# Patient Record
Sex: MALE | Race: OTHER | NOT HISPANIC OR LATINO | URBAN - METROPOLITAN AREA
[De-identification: names, ages, dates, MRNs, and addresses within clinical notes are randomized per-mention and may not be internally consistent; named-entity substitution may affect disease eponyms.]

---

## 2018-08-20 ENCOUNTER — EMERGENCY (EMERGENCY)
Facility: HOSPITAL | Age: 63
LOS: 1 days | Discharge: HOME | End: 2018-08-20
Attending: STUDENT IN AN ORGANIZED HEALTH CARE EDUCATION/TRAINING PROGRAM
Payer: MEDICAID

## 2018-08-20 VITALS
RESPIRATION RATE: 18 BRPM | TEMPERATURE: 99 F | SYSTOLIC BLOOD PRESSURE: 152 MMHG | HEART RATE: 94 BPM | DIASTOLIC BLOOD PRESSURE: 90 MMHG | OXYGEN SATURATION: 98 %

## 2018-08-20 PROCEDURE — 99283 EMERGENCY DEPT VISIT LOW MDM: CPT | Mod: 25

## 2018-08-20 NOTE — ED ADULT TRIAGE NOTE - CHIEF COMPLAINT QUOTE
R hip pain x 2 months, has had a spinal CT. was referred to orthopedic but would not take his medicare

## 2018-08-21 VITALS
TEMPERATURE: 98 F | OXYGEN SATURATION: 95 % | HEART RATE: 81 BPM | SYSTOLIC BLOOD PRESSURE: 126 MMHG | RESPIRATION RATE: 17 BRPM | DIASTOLIC BLOOD PRESSURE: 84 MMHG

## 2018-08-21 PROBLEM — Z00.00 ENCOUNTER FOR PREVENTIVE HEALTH EXAMINATION: Status: ACTIVE | Noted: 2018-08-21

## 2018-08-21 PROCEDURE — 73502 X-RAY EXAM HIP UNI 2-3 VIEWS: CPT

## 2018-08-21 PROCEDURE — 99284 EMERGENCY DEPT VISIT MOD MDM: CPT

## 2018-08-21 PROCEDURE — 73502 X-RAY EXAM HIP UNI 2-3 VIEWS: CPT | Mod: 26,RT

## 2018-08-21 PROCEDURE — 72170 X-RAY EXAM OF PELVIS: CPT

## 2018-08-21 RX ORDER — ACETAMINOPHEN 500 MG
650 TABLET ORAL ONCE
Qty: 0 | Refills: 0 | Status: COMPLETED | OUTPATIENT
Start: 2018-08-21 | End: 2018-08-21

## 2018-08-21 RX ORDER — IBUPROFEN 200 MG
600 TABLET ORAL ONCE
Qty: 0 | Refills: 0 | Status: COMPLETED | OUTPATIENT
Start: 2018-08-21 | End: 2018-08-21

## 2018-08-21 RX ADMIN — Medication 650 MILLIGRAM(S): at 01:32

## 2018-08-21 RX ADMIN — Medication 600 MILLIGRAM(S): at 01:32

## 2018-08-21 NOTE — ED PROVIDER NOTE - PROGRESS NOTE DETAILS
Sanket PGY-3: Pt reports improved hip pain. Xray prelim read negative. Will d.c home with ortho follow up.

## 2018-08-21 NOTE — ED ADULT NURSE REASSESSMENT NOTE - NS ED NURSE REASSESS RESPONSE TO CARE
feeling better/patient states "ready to go home"/cane given to pt by Dr Coles and pt ambulating with steady gait with cane and states " I feel better walking now"

## 2018-08-21 NOTE — ED PROVIDER NOTE - MEDICAL DECISION MAKING DETAILS
63M p/w likely arthritic vs sciatic right hip pain. Low suspicion for cord compression. Will check xray hip, give tylenol and advil and provide ortho referral

## 2018-08-21 NOTE — ED PROVIDER NOTE - PLAN OF CARE
Follow up with your PMD in 24-48 hours. Follow up with an orthopedist at the next available appointment.   Take tylenol 650mg every 6 hours and ibuprofen 400 mg every 4 hours as needed for pain.

## 2018-08-21 NOTE — ED PROVIDER NOTE - ATTENDING CONTRIBUTION TO CARE
batsheva hip pain decr hip rom rst wnl 62 yo M pmh lumbar disc dz with worsening right hip pain x 2 months. non radiating and  worse with walking. he denies trauma. fever, chills. Has been using cane ot help him walk. He is pending ortho appt and evaluation.   GEN: no acute respiratory distress. nontoxic, speaking comfortably in full sentences, ambulating with steady gait with cane.  HEENT: NCAT. face symmetrical. PERRL 4mm, EOMI  MMM, oropharynx wnl.  Neck: no JVD, trachea midline, no LAD  CV: RRR. +S1S2, no murmur. 2+ pulses in 4 extremities, cap refill <2 sec  Chest: CTA B/l. no wheezing, rales, rhonchi. no retractions. good air movement.   ABD: +BS, soft, non distended, non tender. No guarding/rebound.  : no cva or suprapubic tenderness  MSK: No clubbing, cyanosis, edema. decreased flexion of right hip 2/2 pain. no tenderness to palpation. No midline or paraspinal tenderness. no spinal step-offs.  Neuro: AOOX3.  CN 2-12 intact; Sensation intact, motor 5/5 throughout. no ataxia  SKIN: No erythema, lesions or rash    right hip pain. neurologically intact. pulses equal bilaterally.no back pain.  r/o occult fx. pain relief. reassess

## 2018-08-21 NOTE — ED ADULT NURSE NOTE - OBJECTIVE STATEMENT
pt c/o "continued rt hip pain x 2 months., minimal relief with alleve -last taken in am. Saw PMD who sent me for a lumbar CT scan and referrla for orthopedic however the orthopedic does not take my insurance and the insurance company told me to come to ER if still having pain. No fall/injury/swelling/brusing. pain is worse with weight bearing and walking"

## 2018-08-21 NOTE — ED PROVIDER NOTE - OBJECTIVE STATEMENT
63M PMH L5 slipped disc p/w worsening right hip pain which is progressive over the past 63M PMH L5 slipped disc p/w worsening right hip pain which is progressive over the past two months and now causing difficulty walking. Pt was supposed to see orthopedist for this issue but visit denied by insurance. Insurance recommended pt come to ER if pain worsened. Denies fevers, chills, abd pain, n/v/d, chest pain, SOB. Denies bladder/bowel incontinence and saddle anesthesia.

## 2018-08-21 NOTE — ED PROVIDER NOTE - PHYSICAL EXAMINATION
Gen: NAD, AOx3  Head: NCAT  HEENT: PERRL, oral mucosa moist, normal conjunctiva, oropharynx clear without exudate or erythema  Lung: CTAB, no respiratory distress, no wheezing, rales, rhonchi  CV: normal s1/s2, rrr, no murmurs, Normal perfusion, pulses 2+ throughout  Abd: soft, NTND, no CVA tenderness  MSK: No edema, no visible deformities, antalgic gait, able to move all extremities, pain with right hip flexion, 5/5 strength in all extremity groups, no spinal tenderness  Neuro: CN II-XII grossly intact, No focal neurologic deficits. Sensation intact in inner thigh.   Skin: No rash   Psych: normal affect

## 2018-08-21 NOTE — ED PROVIDER NOTE - CARE PLAN
Principal Discharge DX:	Pain of right hip joint  Assessment and plan of treatment:	Follow up with your PMD in 24-48 hours. Follow up with an orthopedist at the next available appointment.   Take tylenol 650mg every 6 hours and ibuprofen 400 mg every 4 hours as needed for pain.

## 2019-01-01 ENCOUNTER — EMERGENCY (EMERGENCY)
Facility: HOSPITAL | Age: 64
LOS: 1 days | End: 2019-01-01
Attending: STUDENT IN AN ORGANIZED HEALTH CARE EDUCATION/TRAINING PROGRAM
Payer: MEDICAID

## 2019-01-01 VITALS
HEART RATE: 84 BPM | HEIGHT: 71.26 IN | TEMPERATURE: 98 F | SYSTOLIC BLOOD PRESSURE: 164 MMHG | OXYGEN SATURATION: 96 % | WEIGHT: 289.91 LBS | DIASTOLIC BLOOD PRESSURE: 88 MMHG | RESPIRATION RATE: 95 BRPM

## 2019-01-01 VITALS
HEART RATE: 70 BPM | TEMPERATURE: 98 F | RESPIRATION RATE: 18 BRPM | DIASTOLIC BLOOD PRESSURE: 82 MMHG | OXYGEN SATURATION: 97 % | SYSTOLIC BLOOD PRESSURE: 111 MMHG

## 2019-01-01 LAB
ANION GAP SERPL CALC-SCNC: 14 MMOL/L — SIGNIFICANT CHANGE UP (ref 5–17)
BASOPHILS # BLD AUTO: 0.1 K/UL — SIGNIFICANT CHANGE UP (ref 0–0.2)
BASOPHILS NFR BLD AUTO: 0.8 % — SIGNIFICANT CHANGE UP (ref 0–2)
BUN SERPL-MCNC: 12 MG/DL — SIGNIFICANT CHANGE UP (ref 7–23)
CALCIUM SERPL-MCNC: 9.2 MG/DL — SIGNIFICANT CHANGE UP (ref 8.4–10.5)
CHLORIDE SERPL-SCNC: 101 MMOL/L — SIGNIFICANT CHANGE UP (ref 96–108)
CK SERPL-CCNC: 130 U/L — SIGNIFICANT CHANGE UP (ref 30–200)
CO2 SERPL-SCNC: 23 MMOL/L — SIGNIFICANT CHANGE UP (ref 22–31)
CREAT SERPL-MCNC: 1.12 MG/DL — SIGNIFICANT CHANGE UP (ref 0.5–1.3)
EOSINOPHIL # BLD AUTO: 0.2 K/UL — SIGNIFICANT CHANGE UP (ref 0–0.5)
EOSINOPHIL NFR BLD AUTO: 2.3 % — SIGNIFICANT CHANGE UP (ref 0–6)
GLUCOSE SERPL-MCNC: 108 MG/DL — HIGH (ref 70–99)
HCT VFR BLD CALC: 41.9 % — SIGNIFICANT CHANGE UP (ref 39–50)
HGB BLD-MCNC: 14.5 G/DL — SIGNIFICANT CHANGE UP (ref 13–17)
LYMPHOCYTES # BLD AUTO: 1.9 K/UL — SIGNIFICANT CHANGE UP (ref 1–3.3)
LYMPHOCYTES # BLD AUTO: 27.5 % — SIGNIFICANT CHANGE UP (ref 13–44)
MCHC RBC-ENTMCNC: 30.8 PG — SIGNIFICANT CHANGE UP (ref 27–34)
MCHC RBC-ENTMCNC: 34.7 GM/DL — SIGNIFICANT CHANGE UP (ref 32–36)
MCV RBC AUTO: 88.8 FL — SIGNIFICANT CHANGE UP (ref 80–100)
MONOCYTES # BLD AUTO: 0.5 K/UL — SIGNIFICANT CHANGE UP (ref 0–0.9)
MONOCYTES NFR BLD AUTO: 7.7 % — SIGNIFICANT CHANGE UP (ref 2–14)
NEUTROPHILS # BLD AUTO: 4.3 K/UL — SIGNIFICANT CHANGE UP (ref 1.8–7.4)
NEUTROPHILS NFR BLD AUTO: 61.6 % — SIGNIFICANT CHANGE UP (ref 43–77)
PLATELET # BLD AUTO: 216 K/UL — SIGNIFICANT CHANGE UP (ref 150–400)
POTASSIUM SERPL-MCNC: 4.3 MMOL/L — SIGNIFICANT CHANGE UP (ref 3.5–5.3)
POTASSIUM SERPL-SCNC: 4.3 MMOL/L — SIGNIFICANT CHANGE UP (ref 3.5–5.3)
RBC # BLD: 4.72 M/UL — SIGNIFICANT CHANGE UP (ref 4.2–5.8)
RBC # FLD: 11.8 % — SIGNIFICANT CHANGE UP (ref 10.3–14.5)
SODIUM SERPL-SCNC: 138 MMOL/L — SIGNIFICANT CHANGE UP (ref 135–145)
WBC # BLD: 7 K/UL — SIGNIFICANT CHANGE UP (ref 3.8–10.5)
WBC # FLD AUTO: 7 K/UL — SIGNIFICANT CHANGE UP (ref 3.8–10.5)

## 2019-01-01 PROCEDURE — 80048 BASIC METABOLIC PNL TOTAL CA: CPT

## 2019-01-01 PROCEDURE — 99284 EMERGENCY DEPT VISIT MOD MDM: CPT | Mod: 25

## 2019-01-01 PROCEDURE — 73090 X-RAY EXAM OF FOREARM: CPT

## 2019-01-01 PROCEDURE — 85027 COMPLETE CBC AUTOMATED: CPT

## 2019-01-01 PROCEDURE — 82550 ASSAY OF CK (CPK): CPT

## 2019-01-01 PROCEDURE — 73090 X-RAY EXAM OF FOREARM: CPT | Mod: 26,LT

## 2019-01-01 PROCEDURE — 99283 EMERGENCY DEPT VISIT LOW MDM: CPT

## 2019-01-01 RX ORDER — IBUPROFEN 200 MG
600 TABLET ORAL ONCE
Qty: 0 | Refills: 0 | Status: COMPLETED | OUTPATIENT
Start: 2019-01-01 | End: 2019-01-01

## 2019-01-01 RX ADMIN — Medication 600 MILLIGRAM(S): at 03:23

## 2019-01-01 NOTE — ED PROVIDER NOTE - NSFOLLOWUPCLINICS_GEN_ALL_ED_FT
[facility]  Orthopedic Surgery  [address], [address]rd & [address]th [place] [address]  Phone: [phone]  Fax:   Follow Up Time: 7-10 Days

## 2019-01-01 NOTE — ED ADULT NURSE NOTE - NSIMPLEMENTINTERV_GEN_ALL_ED
Implemented All Universal Safety Interventions:  [place] to call system. Call bell, personal items and telephone within reach. Instruct patient to call for assistance. Room bathroom lighting operational. Non-slip footwear when patient is off stretcher. Physically safe environment: no spills, clutter or unnecessary equipment. Stretcher in lowest position, wheels locked, appropriate side rails in place.

## 2019-01-01 NOTE — ED ADULT TRIAGE NOTE - CHIEF COMPLAINT QUOTE
R forearm pain x3 days "now its getting hard"  "I was carrying groceries 5 days ago"  no redness/discoloration

## 2019-01-01 NOTE — ED PROVIDER NOTE - PHYSICAL EXAMINATION
R Arm: there is some hard swelling on the volar medial side of the forearm that is tender when palpated without overlying skin changes. Full function of the right hand, good radial and ulnar pulses. Cj Huffman DO: R Arm: isolated swelling and tenderness to the medial volar surface of the right arm. compartments soft, no ecchymosis, induration, increased warmth, skin breaks, crepitus. FROM of right upper extremity. 2+, equal radial and ulnar pulses bilaterally. 5/5 bilateral digits, wrists, forearms, elbows, shoulders. sensation equal and grossly intact in bilateral upper extremities.

## 2019-01-01 NOTE — ED ADULT NURSE NOTE - OBJECTIVE STATEMENT
63 yr old male arrived to the ED from home c/o R arm pain. per pt he was carrying heavy grocery bags on Wednesday for a few block, pt doesn't remember if he had the bags on his arm or in his hand. upon assessment pt is A&ox4, ambulatory wit ha steady gait, VSS, lungs clear [place]. pt right forearm appears swollen on lateral/ dorsal aspect. arm is firm, and tender to palpation. radial pulses strong and equal [place]. pt able to make fist, adduct and abduct fingers. full ROM of wrist and elbow joints. no redness or warmth noted. pt denies fever, numbness, tingling, chest pain, sob, nausea, and vomiting

## 2019-01-01 NOTE — ED PROVIDER NOTE - OBJECTIVE STATEMENT
Pt is a 63 Y M with no pmhx who presents with pain in his R forearm. He states that on Wed he was carrying a large amount of heavy bags on his arms and hands for a few blocks. He says that sat he noticed some significant swelling of the arm that then got a little better, then today he noticed that it was even more swollen and now painful. He denies fever, or skin changes. He denies any hx of drug use or other injury to the area. He denies any numbness tingling in the hand or inability to use the hand.

## 2019-01-01 NOTE — ED PROVIDER NOTE - MEDICAL DECISION MAKING DETAILS
63 Y M with no pmhx with forearm pain on the right with swelling and ttp. Cj Huffman DO: 62 yo M with focal right forearm pain and swelling. likely secondary to carrying heavy bags on that arm. no signs of SSTI. compartments soft. neurovascularly intact and with normal strength. labs/radiograph without significant abnormality. stable for dc. Return precautions were discussed with patient at bedside and patient expressed understanding.

## 2019-02-26 NOTE — ED ADULT NURSE NOTE - NSFALLRSKOUTCOME_ED_ALL_ED
FYI : Has now had two consistent elevated BP checks, is returning to pharmacy in one month for additional check, is scheduling MD appt for 2 months from now, if BP is still elevated at MD appt, should consider BP medication adjustment.  Universal Safety Interventions

## 2019-07-29 ENCOUNTER — EMERGENCY (EMERGENCY)
Facility: HOSPITAL | Age: 64
LOS: 1 days | Discharge: HOME | End: 2019-07-29
Attending: EMERGENCY MEDICINE
Payer: MEDICAID

## 2019-07-29 VITALS
HEART RATE: 93 BPM | HEIGHT: 71.26 IN | TEMPERATURE: 99 F | SYSTOLIC BLOOD PRESSURE: 127 MMHG | RESPIRATION RATE: 18 BRPM | DIASTOLIC BLOOD PRESSURE: 88 MMHG | WEIGHT: 300.05 LBS | OXYGEN SATURATION: 95 %

## 2019-07-29 PROCEDURE — 99282 EMERGENCY DEPT VISIT SF MDM: CPT

## 2019-07-29 NOTE — ED PROVIDER NOTE - NSFOLLOWUPINSTRUCTIONS_ED_ALL_ED_FT
Please take motrin 600 mg every 6 hours and/or tylenol 650 mg every 6 hours for pain as needed.  Please follow up with dentist within 48 hours.   Return to the ED for signs of infection, such as fever, swelling, discharge, difficulty swallowing, difficulty breathing.

## 2019-07-29 NOTE — ED PROVIDER NOTE - CLINICAL SUMMARY MEDICAL DECISION MAKING FREE TEXT BOX
65 y/o M presents after his left lower crowns fell out 1 week ago. pt requesting for extraction of teeth #20 and #21 in ED. Pt had an appt today for extraction however, did not attend appt bc he could not find parking. There are no signs of infection, abscess on exam. Pt to be discharged home with dental follow up 65 y/o M presents after his left lower crowns fell out 1 week ago. pt requesting for extraction of teeth #20 and #21 in ED. Pt had an appt today for extraction however, did not attend appt bc he could not find parking. There are no signs of infection, abscess on exam. Pt to be discharged home with dental follow up  rl pt sp crown disloadgment scheduled for tooth extraction missed his appointment -- wants extraction in ed -- no evidence of infecton no fluctuance, no fever -- no swelling - pain well controlled w nsaids - refer back to dental and dc

## 2019-07-29 NOTE — ED PROVIDER NOTE - NS ED ROS FT
Constitutional: no fevers or chills  HEENT: left lower teeth pain. no discharge  CV: no cp  Resp: no sob  GI: no abd pain, n/v, diarrhea/constipation  : no dysuria, hematuria  MSK: no joint pains  skin: no rashes  neuro: no HA, no confusion  psych: no SI/HI  heme: no LAD

## 2019-07-29 NOTE — ED ADULT NURSE NOTE - OBJECTIVE STATEMENT
Pt is 64 Y M presenting to ED with c/o of L lower dental pain. Pt reports his left sided dental bridge fell out approx 1 week ago and was unable to get an appointment to extract his dental implants #20 and #21. Pt reports increased pain and weakness causing him to come to ED. Pt reports relief in dental pain when taking meloxicam as prescribed for R hip pain.  Pt denies decreased PO intake, fevers, chills. Pt breathing unlabored on RA. Teeth missing to left lower side of mouth. No purulent drainage/bleeding noted. Safety and comfort maintained.

## 2019-07-29 NOTE — ED ADULT TRIAGE NOTE - CHIEF COMPLAINT QUOTE
left sided dental pain, pt reports that his crown fell out and he needs his decaying teeth to be taken out

## 2019-07-29 NOTE — ED PROVIDER NOTE - PHYSICAL EXAMINATION
PHYSICAL EXAM:  GENERAL: in NAD, Sitting comfortable in bed, in no respiratory distress  HEAD: Atraumatic, no alvarado's sign, no periorbital ecchymosis   EYES: PERRL, EOMs intact b/l w/out deficits  ENMT: Moist membranes, no anterior/posterior, or supraclavicular LAD  No evidence of discharge, swelling, erythema, fluctuance in mouth. No lymphadenopathy  CHEST/LUNG: CTAB no wheezes/rhonchi/rales  HEART: RRR no murmur/gallops/rubs  ABDOMEN: +BS, soft, NT, ND  EXTREMITIES: No LE edema, +2 radila pulses b/l  MUSCULOSKELETAL: FROM of all 4 extremities; Non-tender to _____  NERVOUS SYSTEM:  A&Ox3, No motor deficits or sensory deficits to b/l UEs  SKIN:  No new rashes or DTIs

## 2019-07-29 NOTE — ED PROVIDER NOTE - OBJECTIVE STATEMENT
65 yo M with no significant PMHx presents to the ED c/o left lower tooth pain x1 week after his crowns fell out while eating. He saw a dentist and was advised to have teeth #20 and #21 extracted. He had an appt today for extraction however, states he could not find parking so he did not go to this appt. He comes to the ED asking for extraction of teeth #20 and #21. Patient able to eat and swallow. No swelling or discharge from the site. No fever, chills, n/v.

## 2020-01-06 ENCOUNTER — EMERGENCY (EMERGENCY)
Facility: HOSPITAL | Age: 65
LOS: 1 days | Discharge: HOME | End: 2020-01-06
Attending: EMERGENCY MEDICINE
Payer: MEDICAID

## 2020-01-06 VITALS
HEART RATE: 84 BPM | HEIGHT: 71.26 IN | SYSTOLIC BLOOD PRESSURE: 128 MMHG | WEIGHT: 289.91 LBS | TEMPERATURE: 98 F | RESPIRATION RATE: 17 BRPM | OXYGEN SATURATION: 97 % | DIASTOLIC BLOOD PRESSURE: 88 MMHG

## 2020-01-06 VITALS
SYSTOLIC BLOOD PRESSURE: 138 MMHG | HEART RATE: 83 BPM | OXYGEN SATURATION: 99 % | DIASTOLIC BLOOD PRESSURE: 88 MMHG | RESPIRATION RATE: 16 BRPM

## 2020-01-06 PROCEDURE — 73630 X-RAY EXAM OF FOOT: CPT | Mod: 26,RT

## 2020-01-06 PROCEDURE — 99283 EMERGENCY DEPT VISIT LOW MDM: CPT

## 2020-01-06 PROCEDURE — 73630 X-RAY EXAM OF FOOT: CPT

## 2020-01-06 RX ORDER — ACETAMINOPHEN 500 MG
650 TABLET ORAL ONCE
Refills: 0 | Status: COMPLETED | OUTPATIENT
Start: 2020-01-06 | End: 2020-01-06

## 2020-01-06 RX ORDER — CEPHALEXIN 500 MG
500 CAPSULE ORAL ONCE
Refills: 0 | Status: COMPLETED | OUTPATIENT
Start: 2020-01-06 | End: 2020-01-06

## 2020-01-06 RX ORDER — CEPHALEXIN 500 MG
1 CAPSULE ORAL
Qty: 28 | Refills: 0
Start: 2020-01-06 | End: 2020-01-12

## 2020-01-06 RX ORDER — IBUPROFEN 200 MG
400 TABLET ORAL ONCE
Refills: 0 | Status: COMPLETED | OUTPATIENT
Start: 2020-01-06 | End: 2020-01-06

## 2020-01-06 RX ADMIN — Medication 400 MILLIGRAM(S): at 06:24

## 2020-01-06 RX ADMIN — Medication 650 MILLIGRAM(S): at 06:24

## 2020-01-06 RX ADMIN — Medication 500 MILLIGRAM(S): at 06:24

## 2020-01-06 NOTE — ED PROVIDER NOTE - OBJECTIVE STATEMENT
64M PMH HLD p/w R toe pain. Pt states last week he went skiing and was wearing rented boots that we too small. His foot was fine until this Friday when he noticed some redness over the dorsal aspect of his foot. On Saturday he noticed that his pain became more severe, and continued to worsen to today so he came to the ER.

## 2020-01-06 NOTE — ED PROVIDER NOTE - ATTENDING CONTRIBUTION TO CARE
64M w/ PMHx of HLD presenting with R midfoot cellulitis, states that he was skiing and was wearing rented booths that weight tight, they got wet, his feet were wet. States that his foot started having streaking of erythema near his R first toe, progressively started getting worse slowly over Friday, states that Saturday he had pain with ambulation. Denies any alleviating factors. States that he has never had cellulitis before. Denies any history of gout.     PE remarkable for slight hypertension on VS, afebrile; R foot with erythema at dorsal base of R 1st toe, with tenderness, slight warmth, ROM of toe limited 2/2 pain, cap refill <2s, no crepitus; rest of exam normal as above.    Impression: Cellulitis, less likely gout; no obvious lacerations/wounds, XR, pain management, antibiotics; f/u outpatient podiatry. Strict return precautions.

## 2020-01-06 NOTE — ED PROVIDER NOTE - NS ED ROS FT
REVIEW OF SYSTEMS:  General:  no fever, no chills  HEENT: no headache, no vision changes  Cardiac: no chest pain, no palpitations  Respiratory: no cough, no shortness of breath  Gastrointestinal: no abdominal pain, no nausea, no vomiting, no diarrhea  Genitourinary: no hematuria, no dysuria, no urinary frequency  Extremities: +toe pain. no extremity swelling, no extremity pain  Neuro: no focal weakness, no numbness/tingling of the extremities, no decreased sensation  Heme: no easy bleeding, no easy bruising  Skin: +redness of foot. no jaundice, no lesions  All other ROS as documented in HPI  -Gurdeep Gracia, PGY-2

## 2020-01-06 NOTE — ED PROVIDER NOTE - PATIENT PORTAL LINK FT
You can access the FollowMyHealth Patient Portal offered by [facility] by registering at the following website: [email or web address]. By joining [employer]’s FollowMyHealth portal, you will also be able to view your health information using other applications (apps) compatible with our system.

## 2020-01-06 NOTE — ED PROVIDER NOTE - PHYSICAL EXAMINATION
General: Well developed, well nourished, in no acute distress.   HEENT: Normocephalic and atraumatic, Trachea midline.   Cardiac: Normal S1 and S2 w/ RRR. No MRG.  Pulmonary: CTA bilaterally. No increased WOB.   Abdominal: Soft, NTND  Neurologic: No focal sensory or motor deficits.  Musculoskeletal: TTP over dorsal aspect of R 1st phalange near area of rash. Full ROM of IP joint. MCP joint somewhat limited (more in dorsiflexion against rash)   Vascular: Warm and well perfused  Skin: Small area of redness and warmth to dorsum of R foot at base of 1st phalange.   Psychiatric: Appropriate mood and affect. No apparent risk to self or others.  Gurdeep Gracia M.D. PGY-2 General: Well developed, well nourished, in no acute distress.   HEENT: Normocephalic and atraumatic, Trachea midline.   Cardiac: Normal S1 and S2 w/ RRR. No MRG.  Pulmonary: CTA bilaterally. No increased WOB.   Abdominal: Soft, NTND  Neurologic: No focal sensory or motor deficits.  Musculoskeletal: TTP over dorsal aspect of R 1st phalange near area of rash. Full ROM of IP joint. MCP joint somewhat limited (more in dorsiflexion against rash)   Vascular: Warm and well perfused  Skin: Small area of redness and warmth to dorsum of R foot at base of 1st phalange. No-fluctuance.   Psychiatric: Appropriate mood and affect. No apparent risk to self or others.  Gurdeep Gracia M.D. PGY-2

## 2020-01-06 NOTE — ED PROVIDER NOTE - CLINICAL SUMMARY MEDICAL DECISION MAKING FREE TEXT BOX
64M p/w R toe pain and rash. Appears to be a mild cellulitis, less likely gout as erythema is spread from base of digit. Pt is non-toxic appearing. Will obtain xray to r/o fracture in setting of skiing. Likely d/c home with oral abx

## 2020-01-06 NOTE — ED ADULT NURSE NOTE - OBJECTIVE STATEMENT
pt is 64 year old male c/o left foot/toe pain, pt went skiing last week and used rented boots, pt reports feeling ok when he came back but 3 days later he had pain to the first great toe and pain has been progressively getting worse, pt cannot bear weight and has to ambulate with cane, no fevers, no swelling, +redness to top of foot

## 2020-01-06 NOTE — ED PROVIDER NOTE - NSFOLLOWUPINSTRUCTIONS_ED_ALL_ED_FT
1. TAKE ALL MEDICATIONS AS DIRECTED.    2. FOR PAIN OR FEVER YOU CAN TAKE IBUPROFEN (MOTRIN, ADVIL) OR ACETAMINOPHEN (TYLENOL) AS NEEDED, AS DIRECTED ON PACKAGING.  3. FOLLOW UP WITH YOUR PRIMARY DOCTOR WITHIN 5 DAYS AS DIRECTED.  4. IF YOU HAD LABS OR IMAGING DONE, YOU WERE GIVEN COPIES OF ALL LABS AND/OR IMAGING RESULTS FROM YOUR ER VISIT--PLEASE TAKE THEM WITH YOU TO YOUR FOLLOW UP APPOINTMENTS.  5. IF NEEDED, CALL PATIENT ACCESS SERVICES AT [phone] TO FIND A PRIMARY CARE PHYSICIAN.  OR CALL [phone] TO MAKE AN APPOINTMENT WITH THE CLINIC.  6. RETURN TO THE ER FOR ANY WORSENING SYMPTOMS OR CONCERNS.     Cellulitis    Cellulitis is a skin infection caused by bacteria. This condition occurs most often in the arms and lower legs but can occur anywhere over the body. Symptoms include redness, swelling, warm skin, tenderness, and chills/fever. If you were prescribed an antibiotic medicine, take it as told by your health care provider. Do not stop taking the antibiotic even if you start to feel better.    SEEK IMMEDIATE MEDICAL CARE IF YOU HAVE ANY OF THE FOLLOWING SYMPTOMS: worsening fever, red streaks coming from affected area, vomiting or diarrhea, or dizziness/lightheadedness.

## 2020-01-13 ENCOUNTER — EMERGENCY (EMERGENCY)
Facility: HOSPITAL | Age: 65
LOS: 1 days | Discharge: HOME | End: 2020-01-13
Attending: EMERGENCY MEDICINE
Payer: MEDICAID

## 2020-01-13 VITALS
WEIGHT: 285.06 LBS | RESPIRATION RATE: 18 BRPM | SYSTOLIC BLOOD PRESSURE: 127 MMHG | TEMPERATURE: 98 F | OXYGEN SATURATION: 95 % | DIASTOLIC BLOOD PRESSURE: 79 MMHG | HEIGHT: 72 IN | HEART RATE: 86 BPM

## 2020-01-13 VITALS
RESPIRATION RATE: 16 BRPM | OXYGEN SATURATION: 100 % | TEMPERATURE: 98 F | HEART RATE: 81 BPM | DIASTOLIC BLOOD PRESSURE: 82 MMHG | SYSTOLIC BLOOD PRESSURE: 115 MMHG

## 2020-01-13 LAB
ALBUMIN SERPL ELPH-MCNC: 4.4 G/DL — SIGNIFICANT CHANGE UP (ref 3.3–5)
ALP SERPL-CCNC: 104 U/L — SIGNIFICANT CHANGE UP (ref 40–120)
ALT FLD-CCNC: 20 U/L — SIGNIFICANT CHANGE UP (ref 10–45)
ANION GAP SERPL CALC-SCNC: 12 MMOL/L — SIGNIFICANT CHANGE UP (ref 5–17)
AST SERPL-CCNC: 16 U/L — SIGNIFICANT CHANGE UP (ref 10–40)
BASOPHILS # BLD AUTO: 0.03 K/UL — SIGNIFICANT CHANGE UP (ref 0–0.2)
BASOPHILS NFR BLD AUTO: 0.4 % — SIGNIFICANT CHANGE UP (ref 0–2)
BILIRUB SERPL-MCNC: 0.5 MG/DL — SIGNIFICANT CHANGE UP (ref 0.2–1.2)
BUN SERPL-MCNC: 17 MG/DL — SIGNIFICANT CHANGE UP (ref 7–23)
CALCIUM SERPL-MCNC: 9.4 MG/DL — SIGNIFICANT CHANGE UP (ref 8.4–10.5)
CHLORIDE SERPL-SCNC: 102 MMOL/L — SIGNIFICANT CHANGE UP (ref 96–108)
CO2 SERPL-SCNC: 24 MMOL/L — SIGNIFICANT CHANGE UP (ref 22–31)
CREAT SERPL-MCNC: 1.24 MG/DL — SIGNIFICANT CHANGE UP (ref 0.5–1.3)
EOSINOPHIL # BLD AUTO: 0.06 K/UL — SIGNIFICANT CHANGE UP (ref 0–0.5)
EOSINOPHIL NFR BLD AUTO: 0.9 % — SIGNIFICANT CHANGE UP (ref 0–6)
GLUCOSE SERPL-MCNC: 98 MG/DL — SIGNIFICANT CHANGE UP (ref 70–99)
HCT VFR BLD CALC: 40.1 % — SIGNIFICANT CHANGE UP (ref 39–50)
HGB BLD-MCNC: 13.3 G/DL — SIGNIFICANT CHANGE UP (ref 13–17)
IMM GRANULOCYTES NFR BLD AUTO: 0.4 % — SIGNIFICANT CHANGE UP (ref 0–1.5)
LYMPHOCYTES # BLD AUTO: 1.52 K/UL — SIGNIFICANT CHANGE UP (ref 1–3.3)
LYMPHOCYTES # BLD AUTO: 21.6 % — SIGNIFICANT CHANGE UP (ref 13–44)
MCHC RBC-ENTMCNC: 29.7 PG — SIGNIFICANT CHANGE UP (ref 27–34)
MCHC RBC-ENTMCNC: 33.2 GM/DL — SIGNIFICANT CHANGE UP (ref 32–36)
MCV RBC AUTO: 89.5 FL — SIGNIFICANT CHANGE UP (ref 80–100)
MONOCYTES # BLD AUTO: 0.5 K/UL — SIGNIFICANT CHANGE UP (ref 0–0.9)
MONOCYTES NFR BLD AUTO: 7.1 % — SIGNIFICANT CHANGE UP (ref 2–14)
NEUTROPHILS # BLD AUTO: 4.9 K/UL — SIGNIFICANT CHANGE UP (ref 1.8–7.4)
NEUTROPHILS NFR BLD AUTO: 69.6 % — SIGNIFICANT CHANGE UP (ref 43–77)
NRBC # BLD: 0 /100 WBCS — SIGNIFICANT CHANGE UP (ref 0–0)
PLATELET # BLD AUTO: 241 K/UL — SIGNIFICANT CHANGE UP (ref 150–400)
POTASSIUM SERPL-MCNC: 4 MMOL/L — SIGNIFICANT CHANGE UP (ref 3.5–5.3)
POTASSIUM SERPL-SCNC: 4 MMOL/L — SIGNIFICANT CHANGE UP (ref 3.5–5.3)
PROT SERPL-MCNC: 7.7 G/DL — SIGNIFICANT CHANGE UP (ref 6–8.3)
RBC # BLD: 4.48 M/UL — SIGNIFICANT CHANGE UP (ref 4.2–5.8)
RBC # FLD: 12.2 % — SIGNIFICANT CHANGE UP (ref 10.3–14.5)
SODIUM SERPL-SCNC: 138 MMOL/L — SIGNIFICANT CHANGE UP (ref 135–145)
WBC # BLD: 7.04 K/UL — SIGNIFICANT CHANGE UP (ref 3.8–10.5)
WBC # FLD AUTO: 7.04 K/UL — SIGNIFICANT CHANGE UP (ref 3.8–10.5)

## 2020-01-13 PROCEDURE — 99283 EMERGENCY DEPT VISIT LOW MDM: CPT

## 2020-01-13 PROCEDURE — 85027 COMPLETE CBC AUTOMATED: CPT

## 2020-01-13 PROCEDURE — 80053 COMPREHEN METABOLIC PANEL: CPT

## 2020-01-13 PROCEDURE — 99284 EMERGENCY DEPT VISIT MOD MDM: CPT

## 2020-01-13 RX ORDER — AZTREONAM 2 G
2 VIAL (EA) INJECTION
Qty: 40 | Refills: 0
Start: 2020-01-13 | End: 2020-01-22

## 2020-01-13 RX ADMIN — Medication 2 TABLET(S): at 20:11

## 2020-01-13 NOTE — ED PROVIDER NOTE - PHYSICAL EXAMINATION
*GEN: comfortable, in no acute distress, AOx3  *EYES: pupils equally round and reactive to light, extra-occular movements intact  *HEENT: airway patent, moist mucosal membranes, full ROM neck  *CV: regular rate and rhythm, normal S1 and S2  *RESP: breathing comfortably, clear to auscultation bilaterally  *ABD: soft, non-tender, non-distended  *: no cva/flank tenderness  *EXTREM: erythema of R MTP joint, no fluctuance, drainage or ulcer noted, full ROM  *SKIN: dry, intact  *NEURO: AOx3, no focal weakness or loss of sensation *GEN: comfortable, in no acute distress, AOx3  *EYES: pupils equally round and reactive to light, extra-occular movements intact  *HEENT: airway patent, moist mucosal membranes, full ROM neck  *CV: regular rate and rhythm, normal S1 and S2  *RESP: breathing comfortably, clear to auscultation bilaterally  *ABD: soft, non-tender, non-distended  *: no cva/flank tenderness  *EXTREM: erythema of R MTP joint, no fluctuance, drainage or ulcer noted, full ROM  *SKIN: dry, intact  *NEURO: AOx3, no focal weakness or loss of sensation  **ATTENDING ADDENDUM (Dr. Félix Riley): I have reviewed and substantially contributed to the elements of the PE as documented above. I have directly performed an examination of this patient in conjunction with the other members (EM resident/PA/NP) of the patient care team.

## 2020-01-13 NOTE — ED PROVIDER NOTE - CHIEF COMPLAINT
The patient is a 64y Male complaining of The patient is a 64y Male complaining of R toe skin infection.

## 2020-01-13 NOTE — ED PROVIDER NOTE - LOWER EXTREMITY EXAM, RIGHT
**ATTENDING ADDENDUM (Dr. Félix Riley): POSITIVE minimal tenderness of the right great toe metatarsal phalangeal joint. POSITIVE erythema, without streaking, vesicles, ulceration, or distal discoloration. NO numbness, tingling, weakness, or paresthesias. NO tenderness with passive range of motion. Able to weight bear. NO findings to suggest emboli to distal right great toe. NO obvious nail deformity to suggest paronychial infection or ingrown nail.

## 2020-01-13 NOTE — ED PROVIDER NOTE - AGGRAVATING FACTORS
**ATTENDING ADDENDUM (Dr. Félix Riley): NO movement-associated, pleuritic, reproducible, positional, or exertional component to the symptoms. NO history of gout, pseudogout, or crystal arthropathy. NO history of OA or RA.

## 2020-01-13 NOTE — ED PROVIDER NOTE - LAB INTERPRETATION
**ATTENDING ADDENDUM (Dr. Félix Riley): Labs reviewed. Pertinent findings include: NO severe leukocytosis or electrolyte-metabolic-endocrine derangements.

## 2020-01-13 NOTE — ED PROVIDER NOTE - PATIENT PORTAL LINK FT
You can access the FollowMyHealth Patient Portal offered by [facility] by registering at the following website: [email or web address]. By joining [employer]’s FollowMyHealth portal, you will also be able to view your health information using other applications (apps) compatible with our system.

## 2020-01-13 NOTE — ED PROVIDER NOTE - RESPIRATORY, MLM
Breath sounds clear and equal bilaterally. **ATTENDING ADDENDUM (Dr. Félix Riley): NO wheezing, rales, rhonchi, crackles, stridor, drooling, retractions, nasal flaring, or tripoding.

## 2020-01-13 NOTE — ED PROVIDER NOTE - NS ED ROS FT
CONST: no fevers, chills, or lightheadedness  EYES: no pain, no vision change  HENT: atraumatic, no sore throat  CV: no chest pain, no palpitations  RESP: no shortness of breath  ABD: no abdominal pain, no nausea/vomiting  : no dysuria, no hematuria  MSK: +mild pain over R MTP joint  NEURO: no headache, no focal weakness or loss of sensation  SKIN:  no rash, no lesions CONST: no fevers, chills, or lightheadedness  EYES: no pain, no vision change  HENT: atraumatic, no sore throat  CV: no chest pain, no palpitations  RESP: no shortness of breath  ABD: no abdominal pain, no nausea/vomiting  : no dysuria, no hematuria  MSK: +mild pain over R MTP joint  NEURO: no headache, no focal weakness or loss of sensation  SKIN:  no rash, no lesions  **ATTENDING ADDENDUM (Dr. Félix Riley): During my interview with the patient, I have personally obtained and/or have directly verified the elements in the past medical/surgical history and other histories as noted earlier in the EMR,  in conjunction with the other members (EM resident/PA/NP) of the patient care team. I have also personally obtained and/or have directly verified/reviewed the review of systems as documented below, in conjunction with the other members (EM resident/PA/NP) of the patient care team.

## 2020-01-13 NOTE — ED PROVIDER NOTE - PLAN OF CARE
S/p Keflex, will treat with double strength bactrim. **ATTENDING ADDENDUM (Dr. Félix Riley): Goals of care include resolution of emergent/urgent symptoms and concerns, and restoration to baseline level of homeostasis.

## 2020-01-13 NOTE — ED PROVIDER NOTE - RADIATION
**ATTENDING ADDENDUM (Dr. Félix Riley): NO obvious lymphangitic spread, POSITIVE slight warmth, NO distal discoloration, NO ulcerations./no radiation

## 2020-01-13 NOTE — ED PROVIDER NOTE - NSFOLLOWUPINSTRUCTIONS_ED_ALL_ED_FT
- Take bactrim DS 2 tabs twice a day for 10 days.    - Follow up with the podiatry clinic: [facility] [phone]    - Return to the ED if your symptoms worsen, you experience fevers greater than 100.4F, you have worsening pain or inability to walk.

## 2020-01-13 NOTE — ED ADULT NURSE NOTE - NSIMPLEMENTINTERV_GEN_ALL_ED
Implemented All Fall Risk Interventions:  [place] to call system. Call bell, personal items and telephone within reach. Instruct patient to call for assistance. Room bathroom lighting operational. Non-slip footwear when patient is off stretcher. Physically safe environment: no spills, clutter or unnecessary equipment. Stretcher in lowest position, wheels locked, appropriate side rails in place. Provide visual cue, wrist band, yellow gown, etc. Monitor gait and stability. Monitor for mental status changes and reorient to person, place, and time. Review medications for side effects contributing to fall risk. Reinforce activity limits and safety measures with patient and family.

## 2020-01-13 NOTE — ED PROVIDER NOTE - PROGRESS NOTE DETAILS
**ATTENDING ADDENDUM (Dr. Félix Riley): patient serially evaluated throughout ED course. NO acute deterioration up to this time in the ED. NO evidence of septic emboli (NO murmur), serious bacterial infection or sepsis/severe sepsis e.g. necrotizing fasciitis, lymphangitic spread, crystal arthropathy (e.g. gout or pseudogout), deep venous thrombosis, or vascular cause for symptoms. Possible CA-MRSA vs. incomplete resolution of cellulitis s/p completion of antibiotics. Agree with upgrade of antibiotics and close outpatient followup with podiatry service (referral as per ED protocol established). Anticipatory guidance provided. NO evidence of limb-threatening process or serious bacterial infection or sepsis/severe sepsis at this time.

## 2020-01-13 NOTE — ED PROVIDER NOTE - SKIN LATERALITY #1
**ATTENDING ADDENDUM (Dr. Félix Riley): medial aspect proximate to the right great toe metatarsal phalangeal joint/right

## 2020-01-13 NOTE — ED PROVIDER NOTE - RAPID ASSESSMENT
Frandy Samuels rapid assessment documentation for Valeria Mercedes (SHANON):    64 year old male with no pmhx presents to the ED c/o non-healing right foot infection. Pt was presented to [facility] ED x1 week ago and was diagnosed with cellulitis near the first digit of the right foot. Comes in today due to worsening pain and redness. Denies fever, chills. Frandy Samuels rapid assessment documentation for Valeria Mercedes (SHANON):    64 year old male with no pmhx presents to the ED c/o non-healing right foot infection. Pt was presented to [facility] ED x1 week ago and was diagnosed with cellulitis near the first digit of the right foot. Comes in today due to pain and redness- states it is improved since last visit but not resolve. only has pain with palpation, able to bear weight without difficulty. Denies fever, chills.

## 2020-01-13 NOTE — ED PROVIDER NOTE - CHPI ED SYMPTOMS POS
TENDERNESS/INFLAMMATION/**ATTENDING ADDENDUM (Dr. Félix Riley): right great toe ?cellulitis, s/p recent trauma (rubbing of foot within ski boot); completed course of antibiotics (cephalexin)/JOINT SWELLING/PAIN

## 2020-01-13 NOTE — ED ADULT NURSE NOTE - OBJECTIVE STATEMENT
63 yo M A&Ox3 presents to ED for worsening right 1st digit pain and redness. Redness and pain started over a week ago when pt was on a skiing trip and wore ski boots. Pt was seen at [facility] and diagnosed with cellulitis and sent home on antibiotics and told to take Tylenol or Motrin for pain. Pt states they took antibiotic but did not take tylenol or motrin for pain.     w/ PMHx of __ presents to ED c/o _____. Pt denies any CP, SOB, N/V, fever, chills, urinary complaints, constipation, diarrhea, HA, dizziness, weakness. Pt A&Ox4, lungs CTA, +central pulses. Abdomen soft, not tender, not distended. Ambulating w/ steady gait, safety and comfort maintained, no acute distress noted at this time. 63 yo M A&Ox3 presents to ED for worsening right foot 1st digit pain and redness. Redness and pain started over a week ago when pt was on a skiing trip and wore ski boots. Pt was seen at [facility] and diagnosed with cellulitis and sent home on antibiotics and told to take Tylenol or Motrin for pain. Pt states they took antibiotic but did not take Tylenol or Motrin for pain. Right foot 1st digit appears red and swollen, +tenderness to touch. No open wound or discharge noted. Pt able to ambulate with cane at baseline. Denies fevers, chills, n/v/d. No c/o CP, SOB, urinary complaints, constipation, diarrhea, HA, dizziness, weakness. Safety measures maintained with bed in low position and side rails up.

## 2020-01-13 NOTE — ED PROVIDER NOTE - CLINICAL SUMMARY MEDICAL DECISION MAKING FREE TEXT BOX
65yo M with PMH of HLD presenting with cellulitis overlying right MTP joint with no evidence of lymphatic streaking, systemic infection or osteomyelitis. Low suspicion for gout given symptomatic improvement with keflex. Due to lack of MRSA coverage with keflex, will treat with 10 day course of bactrim DS and refer to podiatry clinic. 65yo M with PMH of HLD presenting with cellulitis overlying right MTP joint with no evidence of lymphatic streaking, systemic infection or osteomyelitis. Low suspicion for gout given symptomatic improvement with keflex. Due to lack of MRSA coverage with keflex, will treat with 10 day course of bactrim DS and refer to podiatry clinic.  **ATTENDING MEDICAL DECISION MAKING/SYNTHESIS (Dr. Félix Riley): I have reviewed the Chief Complaint, the HPI, the ROS, and have directly performed and confirmed the findings on the Physical Examination. I have reviewed the medical decision making with all providers, as applicable. The PROBLEM REPRESENTATION at this time is:   The MOST LIKELY DIAGNOSIS, and the LIST OF DIFFERENTIAL DIAGNOSES, includes (but is not limited to) the following: XX.   The likelihood of each of these diagnoses has been appropriately considered in the context of this patient's presentation and my evaluation. PLAN: as described in EMR, including diagnostics, therapeutics and consultation as clinically warranted. I will continue to reevaluate the patient, including the results of all testing, and monitor response to therapy throughout the patient's course in the ED. 65yo M with PMH of HLD presenting with cellulitis overlying right MTP joint with no evidence of lymphatic streaking, systemic infection or osteomyelitis. Low suspicion for gout given symptomatic improvement with keflex. Due to lack of MRSA coverage with keflex, will treat with 10 day course of bactrim DS and refer to podiatry clinic.  **ATTENDING MEDICAL DECISION MAKING/SYNTHESIS (Dr. Félix Riley): I have reviewed the Chief Complaint, the HPI, the ROS, and have directly performed and confirmed the findings on the Physical Examination. I have reviewed the medical decision making with all providers, as applicable. The PROBLEM REPRESENTATION at this time is:   The MOST LIKELY DIAGNOSIS, and the LIST OF DIFFERENTIAL DIAGNOSES, includes (but is not limited to) the followin-year-old man with history of hyperlipidemia with persistent medial right great toe erythema s/p wearing ill-fitting ski boots approximately 1.5 weeks ago; s/p antibiotics (cephalexin). The MOST LIKELY DIAGNOSIS, and the LIST OF DIFFERENTIAL DIAGNOSES, includes (but is not limited to) the following: resolving cellulitis (likely), progression to serious bacterial infection or sepsis/severe sepsis e.g. cellulitis with CA-MRSA, lymphangitic spread, necrotizing fasciitis, or equivalent (considered), occult crystal arthropathy, vascular etiology (NO evidence), septic emboli (NO evidence), ischemia, occult fracture or dislocation (NO evidence), nailbed process e.g. paronychia, ingrown nail or equivalent (NO evidence). The likelihood of each of these diagnoses has been appropriately considered in the context of this patient's presentation and my evaluation. PLAN: as described in EMR, including diagnostics, therapeutics and consultation as clinically warranted. I will continue to reevaluate the patient, including the results of all testing, and monitor response to therapy throughout the patient's course in the ED.   The likelihood of each of these diagnoses has been appropriately considered in the context of this patient's presentation and my evaluation. PLAN: as described in EMR, including diagnostics, therapeutics and consultation as clinically warranted. I will continue to reevaluate the patient, including the results of all testing, and monitor response to therapy throughout the patient's course in the ED.

## 2020-01-13 NOTE — ED PROVIDER NOTE - OBJECTIVE STATEMENT
65yo M with PMH of HLD presenting with R toe soft tissue infection. Patient was seen in the ED on 1/6/2020 for R toe pain after wearing ill-fitting ski boots. Xray showed no fracture, tophus or evidence of osteomyelitis. He was diagnosed with cellulitis of the MTP joint and sent home with a 7 day course of keflex which he finished today. He returns to the ED because he still has erythema overlying his toe, though his pain has significantly improved and he is now able to ambulate without difficulty. He is concerned he needs more antibiotics. He denies fevers, chills, nausea, vomiting, back pain, abdominal pain, calf pain. 63yo M with PMH of HLD presenting with R toe soft tissue infection. Patient was seen in the ED on 1/6/2020 for R toe pain after wearing ill-fitting ski boots. Xray showed no fracture, tophus or evidence of osteomyelitis. He was diagnosed with cellulitis of the MTP joint and sent home with a 7 day course of keflex which he finished today. He returns to the ED because he still has erythema overlying his toe, though his pain has significantly improved and he is now able to ambulate without difficulty. He is concerned he needs more antibiotics. He denies fevers, chills, nausea, vomiting, back pain, abdominal pain, calf pain.  **ATTENDING ADDENDUM (Dr. Félix Riley): I attest that I have directly and personally interviewed and examined this patient and elicited a comparable history of present illness and review of systems as documented, along with my OB/GYN resident on rotation in the ED. I attest that I have made significant contributions to the documentation where necessary and as noted in the EMR.

## 2020-01-13 NOTE — ED PROVIDER NOTE - GASTROINTESTINAL, MLM
Abdomen soft, non-tender **ATTENDING ADDENDUM (Dr. Félix Riley): non-distended. NO guarding, rebound, or rigidity. NO pulsatile or non-pulsatile masses. NO hernias. NO obvious hepatosplenomegaly.

## 2020-01-13 NOTE — ED PROVIDER NOTE - CARE PLAN
Assessment and plan of treatment:	S/p Keflex, will treat with double strength bactrim. Goal:	**ATTENDING ADDENDUM (Dr. Félix Riley): Goals of care include resolution of emergent/urgent symptoms and concerns, and restoration to baseline level of homeostasis.  Assessment and plan of treatment:	S/p Keflex, will treat with double strength bactrim. Principal Discharge DX:	Cellulitis of toe of right foot  Goal:	**ATTENDING ADDENDUM (Dr. Félix Riley): Goals of care include resolution of emergent/urgent symptoms and concerns, and restoration to baseline level of homeostasis.  Assessment and plan of treatment:	S/p Keflex, will treat with double strength bactrim.

## 2020-01-13 NOTE — ED PROVIDER NOTE - MUSCULOSKELETAL [+], MLM
JOINT PAIN/**ATTENDING ADDENDUM (Dr. Félix Riley): POSITIVE right great toe pain (at metatarsal phalangeal joint)

## 2020-11-13 NOTE — ED ADULT NURSE NOTE - CAS TRG GENERAL NORM CIRC DET
Pt was exposed to covid, now c/o sore throat
Capillary refill less/equal to 2 seconds/Strong peripheral pulses

## 2021-01-20 NOTE — ED ADULT NURSE NOTE - NSSUHOSCREENINGYN_ED_ALL_ED
Assessment/Plan:    No problem-specific Assessment & Plan notes found for this encounter  Component      Latest Ref Rng & Units 1/20/2021          11:06 AM   Leukocytes, UA       negative   Nitrite, UA       negative   SL AMB POCT UROBILINOGEN       0 2   POCT URINE PROTEIN       15   pH, UA       6 75   Blood, UA       negative   SL AMB SPECIFIC GRAVITY-URINE       1 010   Ketones, UA       trace   BILIRUBIN,UA       negative   Glucose, UA       negative   Color, UA       straw   Clarity, UA       transparent      Diagnoses and all orders for this visit:    Attention deficit disorder without hyperactivity  -     caffeine citrate (CAFCIT) 60 mg/3 mL; Take 3 2 mL (64 mg total) by mouth daily    Dysuria  -     POCT urine dip  -     Urine culture; Future  -     Urine culture    Right lower quadrant abdominal pain  -     CBC and differential; Future  -     Lipase; Future  -     Amylase; Future  -     Comprehensive metabolic panel; Future    Hypercholesterolemia  -     Lipid panel; Future    EKG abnormality  -     ECG 12 lead; Future    Constipation, unspecified constipation type  -     polyethylene glycol (GLYCOLAX) 17 GM/SCOOP powder; Take 17 g by mouth daily In juice, as needed for constipation        Patient Instructions   History of abdominal pain, can start MiraLax to help the bowels more easily  Screening urinalysis with a follow-up urine culture for the history of dysuria  The urinalysis is within normal limits  Repeat the EKG and the lipid panel  CBC, CMP, and amylase and lipase for the current abdominal pain  Caffeine citrate will be available at 3 mL by mouth once a day, as a mild stimulant for the attention deficit disorder   Follow-up:  By telephone at [phone] for the lab and EKG results, and in 1 month to check the effectiveness of the caffeine  Caffeine (By mouth)   Caffeine (KAF-een)  Keeps you mentally alert     Brand Name(s): [facility] Alert Aid, [employer] Sense Stay Awake, [facility] Stay Awake, Keep Alert, NoDoz, Stay Awake, [employer] Stay Awake, [employer]Care Stay Awake, Vivarin   There may be other brand names for this medicine  When This Medicine Should Not Be Used: You should not use this medicine if you have had an allergic reaction to caffeine  How to Use This Medicine:   Tablet  · Your doctor will tell you how much medicine to use  Do not use more than directed  · Follow the instructions on the medicine label if you are using this medicine without a prescription  · Read and follow the patient instructions that come with this medicine  Talk to your doctor or pharmacist if you have any questions  How to Store and Dispose of This Medicine:   · Store the medicine in a closed container at room temperature, away from heat, moisture, and direct light  · Ask your pharmacist, doctor, or health caregiver about the best way to dispose of any outdated medicine or medicine no longer needed  · Keep all medicine out of the reach of children  Never share your medicine with anyone  Drugs and Foods to Avoid:   Ask your doctor or pharmacist before using any other medicine, including over-the-counter medicines, vitamins, and herbal products  · Avoid foods or drinks that contain caffeine while you are using this medicine  Warnings While Using This Medicine:   · Make sure your doctor knows if you are pregnant or breast feeding  · This medicine is for occasional use only  Do not use caffeine to stay awake for long periods of time  You will still need adequate restful sleep  Possible Side Effects While Using This Medicine:   Call your doctor right away if you notice any of these side effects:  · Allergic reaction: Itching or hives, swelling in your face or hands, swelling or tingling in your mouth or throat, chest tightness, trouble breathing  · Ongoing drowsiness  · Unusually fast heartbeat    If you notice these less serious side effects, talk with your doctor:   · Feeling nervous or irritable  · Trouble sleeping  If you notice other side effects that you think are caused by this medicine, tell your doctor  Call your doctor for medical advice about side effects  You may report side effects to FDA at 7-231-AEC-7643  © Copyright [address] [place] [address] Drive Information is for End User's use only and may not be sold, redistributed or otherwise used for commercial purposes  The above information is an  only  It is not intended as medical advice for individual conditions or treatments  Talk to your doctor, nurse or pharmacist before following any medical regimen to see if it is safe and effective for you  Subjective:      Patient ID: Citlalli Renae is a 12 y o  male  Citlalli Renae is a 12year-old male presenting with his mother  He is in the 1st grade, at [employer]  There has been a concern about possible attention deficit hyperactivity disorder  However, his evaluation sheets were not definitive for ADHD  He has been using either coffee or tea to help with his concentration  The coffee or tea have been helpful, but in the last 2 days, they are no longer effective, and he has more angeles  He also has stomach pain and headache  Mother is asking if caffeine can be given as a prescription, so that the intake will be consistent  She states that periodically Camron Bathe take just a fraction of the cup of coffee or tea that he is offered  [place] Bathe has been active in wrestling  No fever  No congestion or cough  No ear pain or sore throat  No vomiting  He has occasional constipation  He says that it hurts periodically when he urinates  Jamey Renteria had elevated lipids on his lab screening  He also had a right ventricular conduction delay on his EKG  Medications:  Flonase and cetirizine  Allergies:  Cats, tree pollen, and latex  No medication allergies      Past Medical History:   Diagnosis Date    Allergic     Eczema     Mononucleosis 07/08/2020    Recurrent abdominal pain 2020    RSV bronchiolitis 2018    Has home nebulizer and albuterol for the home nebulized    Term birth of  male 2014    Emergency Caesarean section for fetal distress a cord around neck  Subsequent benign  course       Past Surgical History:   Procedure Laterality Date    CIRCUMCISION  2014     Family History   Problem Relation Age of Onset    Eczema Mother     Allergies Mother         Allergic to dogs and pollens    Hyperlipidemia Father     Hypertension Father     No Known Problems Sister     No Known Problems Brother     Depression Maternal Grandmother     Hypertension Maternal Grandmother     Hyperlipidemia Maternal Grandmother     Anxiety disorder Maternal Grandfather     Arthritis Maternal Grandfather     Hypertension Paternal Grandmother     Cancer Paternal Grandfather         skin    Autism Cousin     ADD / ADHD Cousin     Alcohol abuse Neg Hx     Substance Abuse Neg Hx      Social History     Socioeconomic History    Marital status: Single     Spouse name: Not on file    Number of children: Not on file    Years of education: Not on file    Highest education level: Not on file   Occupational History    Not on file   Social Needs    Financial resource strain: Not on file    Food insecurity     Worry: Not on file     Inability: Not on file    Transportation needs     Medical: Not on file     Non-medical: Not on file   Tobacco Use    Smoking status: Never Smoker    Smokeless tobacco: Never Used   Substance and Sexual Activity    Alcohol use: Not on file    Drug use: Not on file    Sexual activity: Not on file   Lifestyle    Physical activity     Days per week: Not on file     Minutes per session: Not on file    Stress: Not on file   Relationships    Social connections     Talks on phone: Not on file     Gets together: Not on file     Attends Rastafarian service: Not on file     Active member of club or organization: Not on file     Attends meetings of clubs or organizations: Not on file     Relationship status: Not on file    Intimate partner violence     Fear of current or ex partner: Not on file     Emotionally abused: Not on file     Physically abused: Not on file     Forced sexual activity: Not on file   Other Topics Concern    Not on file   Social History Narrative    Lives with Mom , Dad siblings (one sister and one brother)    Pets: 2 dogs    CO and smoke detectors in home    No smokers in home     Guns in locked safe    Rides in booster seat     Grade 1, [employer], Fall, 2020  Patient Active Problem List   Diagnosis    Tonsillar hypertrophy    Seasonal allergic rhinitis    Allergy to cats    Hypercholesterolemia     The following portions of the patient's history were reviewed and updated as appropriate: allergies, current medications, past family history, past medical history, past social history, past surgical history and problem list     Review of Systems   Constitutional: Negative for fever  HENT: Negative for congestion, ear pain and sore throat  Eyes: Negative for discharge and redness  Respiratory: Negative for cough  Gastrointestinal: Positive for abdominal pain and constipation  Negative for vomiting  Genitourinary: Negative for dysuria  Musculoskeletal: Negative for joint swelling  Skin: Negative for rash  Neurological: Positive for headaches  Psychiatric/Behavioral: Negative for behavioral problems  Objective:      /64   Pulse 94   Temp (!) 96 7 °F (35 9 °C) (Tympanic)   Resp 20   Ht 4' 3" (1 295 m)   Wt 31 7 kg (69 lb 12 8 oz)   SpO2 100%   BMI 18 87 kg/m²          Physical Exam  Vitals signs reviewed  Constitutional:       General: He is active  He is not in acute distress  Appearance: Normal appearance  He is normal weight  HENT:      Head: Normocephalic        Right Ear: Tympanic membrane, ear canal and external ear normal       Left Ear: Tympanic membrane, ear canal and external ear normal       Nose: Nose normal       Mouth/Throat:      Mouth: Mucous membranes are moist       Pharynx: Oropharynx is clear  Eyes:      General:         Right eye: No discharge  Left eye: No discharge  Extraocular Movements: Extraocular movements intact  Conjunctiva/sclera: Conjunctivae normal       Pupils: Pupils are equal, round, and reactive to light  Neck:      Musculoskeletal: Neck supple  Cardiovascular:      Rate and Rhythm: Normal rate and regular rhythm  Heart sounds: Normal heart sounds  No murmur  Pulmonary:      Effort: Pulmonary effort is normal       Breath sounds: Normal breath sounds  Abdominal:      General: Abdomen is flat  Bowel sounds are normal       Palpations: Abdomen is soft  There is no mass  Tenderness: There is no abdominal tenderness  Comments: Negative heel strike and obturators signs  Psoas sign positive on the right  Musculoskeletal: Normal range of motion  Lymphadenopathy:      Cervical: No cervical adenopathy  Skin:     Findings: No rash  Neurological:      General: No focal deficit present  Mental Status: He is alert        Deep Tendon Reflexes: Reflexes normal    Psychiatric:         Mood and Affect: Mood normal  Yes - the patient is able to be screened

## 2021-07-06 NOTE — ED ADULT TRIAGE NOTE - AS O2 DELIVERY
room air
Pt is a 74 y/o male A&Ox4 ambulatory with steady gait sent in by MD c/o chest pain and GRAYSON. Pt states SOB after walking "10 steps." Pt denies N/V/D, fever/chills. Pt talking in short, clear sentences, EKG done and signed, PIV placed, pt placed on CCM.

## 2021-07-27 ENCOUNTER — EMERGENCY (EMERGENCY)
Facility: HOSPITAL | Age: 66
LOS: 1 days | Discharge: HOME | End: 2021-07-27
Attending: EMERGENCY MEDICINE
Payer: MEDICAID

## 2021-07-27 VITALS
WEIGHT: 289.91 LBS | DIASTOLIC BLOOD PRESSURE: 81 MMHG | TEMPERATURE: 98 F | SYSTOLIC BLOOD PRESSURE: 137 MMHG | HEIGHT: 72 IN | OXYGEN SATURATION: 96 % | HEART RATE: 94 BPM | RESPIRATION RATE: 16 BRPM

## 2021-07-27 LAB
ALBUMIN SERPL ELPH-MCNC: 4.2 G/DL — SIGNIFICANT CHANGE UP (ref 3.3–5)
ALP SERPL-CCNC: 103 U/L — SIGNIFICANT CHANGE UP (ref 40–120)
ALT FLD-CCNC: 23 U/L — SIGNIFICANT CHANGE UP (ref 10–45)
ANION GAP SERPL CALC-SCNC: 12 MMOL/L — SIGNIFICANT CHANGE UP (ref 5–17)
AST SERPL-CCNC: 24 U/L — SIGNIFICANT CHANGE UP (ref 10–40)
BASOPHILS # BLD AUTO: 0.03 K/UL — SIGNIFICANT CHANGE UP (ref 0–0.2)
BASOPHILS NFR BLD AUTO: 0.5 % — SIGNIFICANT CHANGE UP (ref 0–2)
BILIRUB SERPL-MCNC: 0.2 MG/DL — SIGNIFICANT CHANGE UP (ref 0.2–1.2)
BUN SERPL-MCNC: 17 MG/DL — SIGNIFICANT CHANGE UP (ref 7–23)
CALCIUM SERPL-MCNC: 9 MG/DL — SIGNIFICANT CHANGE UP (ref 8.4–10.5)
CHLORIDE SERPL-SCNC: 104 MMOL/L — SIGNIFICANT CHANGE UP (ref 96–108)
CO2 SERPL-SCNC: 23 MMOL/L — SIGNIFICANT CHANGE UP (ref 22–31)
CREAT SERPL-MCNC: 1.11 MG/DL — SIGNIFICANT CHANGE UP (ref 0.5–1.3)
EOSINOPHIL # BLD AUTO: 0.13 K/UL — SIGNIFICANT CHANGE UP (ref 0–0.5)
EOSINOPHIL NFR BLD AUTO: 2 % — SIGNIFICANT CHANGE UP (ref 0–6)
GLUCOSE SERPL-MCNC: 95 MG/DL — SIGNIFICANT CHANGE UP (ref 70–99)
HCT VFR BLD CALC: 39.8 % — SIGNIFICANT CHANGE UP (ref 39–50)
HGB BLD-MCNC: 13.2 G/DL — SIGNIFICANT CHANGE UP (ref 13–17)
IMM GRANULOCYTES NFR BLD AUTO: 0.3 % — SIGNIFICANT CHANGE UP (ref 0–1.5)
LYMPHOCYTES # BLD AUTO: 2 K/UL — SIGNIFICANT CHANGE UP (ref 1–3.3)
LYMPHOCYTES # BLD AUTO: 30.4 % — SIGNIFICANT CHANGE UP (ref 13–44)
MCHC RBC-ENTMCNC: 30.9 PG — SIGNIFICANT CHANGE UP (ref 27–34)
MCHC RBC-ENTMCNC: 33.2 GM/DL — SIGNIFICANT CHANGE UP (ref 32–36)
MCV RBC AUTO: 93.2 FL — SIGNIFICANT CHANGE UP (ref 80–100)
MONOCYTES # BLD AUTO: 0.61 K/UL — SIGNIFICANT CHANGE UP (ref 0–0.9)
MONOCYTES NFR BLD AUTO: 9.3 % — SIGNIFICANT CHANGE UP (ref 2–14)
NEUTROPHILS # BLD AUTO: 3.79 K/UL — SIGNIFICANT CHANGE UP (ref 1.8–7.4)
NEUTROPHILS NFR BLD AUTO: 57.5 % — SIGNIFICANT CHANGE UP (ref 43–77)
NRBC # BLD: 0 /100 WBCS — SIGNIFICANT CHANGE UP (ref 0–0)
NT-PROBNP SERPL-SCNC: 137 PG/ML — SIGNIFICANT CHANGE UP (ref 0–300)
PLATELET # BLD AUTO: 221 K/UL — SIGNIFICANT CHANGE UP (ref 150–400)
POTASSIUM SERPL-MCNC: 4.5 MMOL/L — SIGNIFICANT CHANGE UP (ref 3.5–5.3)
POTASSIUM SERPL-SCNC: 4.5 MMOL/L — SIGNIFICANT CHANGE UP (ref 3.5–5.3)
PROT SERPL-MCNC: 7.2 G/DL — SIGNIFICANT CHANGE UP (ref 6–8.3)
RBC # BLD: 4.27 M/UL — SIGNIFICANT CHANGE UP (ref 4.2–5.8)
RBC # FLD: 12.9 % — SIGNIFICANT CHANGE UP (ref 10.3–14.5)
SODIUM SERPL-SCNC: 139 MMOL/L — SIGNIFICANT CHANGE UP (ref 135–145)
WBC # BLD: 6.58 K/UL — SIGNIFICANT CHANGE UP (ref 3.8–10.5)
WBC # FLD AUTO: 6.58 K/UL — SIGNIFICANT CHANGE UP (ref 3.8–10.5)

## 2021-07-27 PROCEDURE — 99285 EMERGENCY DEPT VISIT HI MDM: CPT

## 2021-07-27 NOTE — ED PROVIDER NOTE - OBJECTIVE STATEMENT
65yo M with hx of obesity presenting with complaints of b/l LE swelling. reports swelling of b/l feet over the past 2 weeks. worse at the end of the day after being on his feet all day. has been sleeping with his legs elevated which has been helping. not wearing any compression stockings. yesterday noticed mild redness/purplish coloring of his feet and that his skin appeared shiny. his PMD is on vacation and he was concerned prompting him to come to the ED. no f/c, sob, cp, abd pain, n/v. no calf pain, urinary symptoms. no recent travel, no hormone supplements, no recent surgeries, no Hx of clots.

## 2021-07-27 NOTE — ED PROVIDER NOTE - PATIENT PORTAL LINK FT
You can access the FollowMyHealth Patient Portal offered by [facility] by registering at the following website: [email or web address] By joining [email or web address]’s FollowMyHealth portal, you will also be able to view your health information using other applications (apps) compatible with our system.

## 2021-07-27 NOTE — ED PROVIDER NOTE - SKIN, MLM
Skin normal color for race, warm, dry and intact. No evidence of rash. pitting edema of b/l LE L extending to the mid calf, R to distal calf. no calf tenderness. mild skin discoloration reddish/purplish appearance.

## 2021-07-27 NOTE — ED PROVIDER NOTE - CLINICAL SUMMARY MEDICAL DECISION MAKING FREE TEXT BOX
67yo M with no PMH presenting with complaints of LE edema x2weeks better with elevation, worse at the end of the day. with skin discoloration last night. likely venous stasis. L slightly worse than R. will obtain labs including pro-bnp to assess for new heart failure, kidney disease. UA, US and reassess.

## 2021-07-27 NOTE — ED PROVIDER NOTE - NSFOLLOWUPINSTRUCTIONS_ED_ALL_ED_FT
No signs of emergency medical condition on today's workup.  Presumptive diagnosis made, but further evaluation may be required by your primary care doctor or specialist for a definitive diagnosis.  Therefore, follow up as directed and if symptoms change/worsen or any emergency conditions, please return to the ER.   your presumptive diagnosis is venous statis.   please continue to elevate your legs when you can throughout the day and at night.   recommend getting compression stockings at the pharmacy and wearing them throughout the day.   follow up with your primary care doctor.   return to the emergency department for any new or worsening symptoms.

## 2021-07-27 NOTE — ED PROVIDER NOTE - RAPID ASSESSMENT
67 y/o M presents to ED c/o bl foot swelling x2 weeks, worsening today. Improved with elevating legs. Pt also notes mild redness and "shiny appearing skin" to both LEs. PMD on vacation so pt came to ED. Denies SOB. Not on any diuretics.    Patient was seen as a tele QDOC patient. The patient will be seen and further worked up in the main emergency department and their care will be completed by the main emergency department team along with a thorough physical exam. Receiving team will follow up on labs, analgesia, any clinical imaging, reassess and disposition as clinically indicated, all decisions regarding the progression of care will be made at their discretion.    Scribe Statement: PADMA, Sj Power, attest that this documentation has been prepared under the direction and in the presence of Ashlie Alan) 67 y/o M presents to ED c/o bl foot swelling x2 weeks, worsening today. Improved with elevating legs. Pt also notes mild redness and "shiny appearing skin" to both LEs. PMD on vacation so pt came to ED. Denies SOB. Not on any diuretics.    Patient was seen as a tele QDOC patient. The patient will be seen and further worked up in the main emergency department and their care will be completed by the main emergency department team along with a thorough physical exam. Receiving team will follow up on labs, analgesia, any clinical imaging, reassess and disposition as clinically indicated, all decisions regarding the progression of care will be made at their discretion.    Scribe Statement: I, Sj Power, attest that this documentation has been prepared under the direction and in the presence of Ashlie Alan ()    PADMA, Dr. Alan,  personally performed the rapid assessment described in the documentation, reviewed the rapid assessment documentation recorded by the scribe in my presence and it accurately and completely records my words and action.

## 2021-07-28 VITALS
SYSTOLIC BLOOD PRESSURE: 126 MMHG | HEART RATE: 74 BPM | OXYGEN SATURATION: 99 % | TEMPERATURE: 98 F | RESPIRATION RATE: 18 BRPM | DIASTOLIC BLOOD PRESSURE: 83 MMHG

## 2021-07-28 PROBLEM — E78.5 HYPERLIPIDEMIA, UNSPECIFIED: Chronic | Status: ACTIVE | Noted: 2020-01-25

## 2021-07-28 LAB
APPEARANCE UR: CLEAR — SIGNIFICANT CHANGE UP
BACTERIA # UR AUTO: NEGATIVE — SIGNIFICANT CHANGE UP
BILIRUB UR-MCNC: NEGATIVE — SIGNIFICANT CHANGE UP
COLOR SPEC: YELLOW — SIGNIFICANT CHANGE UP
DIFF PNL FLD: NEGATIVE — SIGNIFICANT CHANGE UP
EPI CELLS # UR: 1 /HPF — SIGNIFICANT CHANGE UP
GLUCOSE UR QL: NEGATIVE — SIGNIFICANT CHANGE UP
HYALINE CASTS # UR AUTO: 2 /LPF — SIGNIFICANT CHANGE UP (ref 0–2)
KETONES UR-MCNC: SIGNIFICANT CHANGE UP
LEUKOCYTE ESTERASE UR-ACNC: ABNORMAL
NITRITE UR-MCNC: NEGATIVE — SIGNIFICANT CHANGE UP
PH UR: 6.5 — SIGNIFICANT CHANGE UP (ref 5–8)
PROT UR-MCNC: ABNORMAL
RBC CASTS # UR COMP ASSIST: 1 /HPF — SIGNIFICANT CHANGE UP (ref 0–4)
SP GR SPEC: 1.04 — HIGH (ref 1.01–1.02)
UROBILINOGEN FLD QL: NEGATIVE — SIGNIFICANT CHANGE UP
WBC UR QL: 5 /HPF — SIGNIFICANT CHANGE UP (ref 0–5)

## 2021-07-28 PROCEDURE — 71045 X-RAY EXAM CHEST 1 VIEW: CPT

## 2021-07-28 PROCEDURE — 99284 EMERGENCY DEPT VISIT MOD MDM: CPT | Mod: 25

## 2021-07-28 PROCEDURE — 81001 URINALYSIS AUTO W/SCOPE: CPT

## 2021-07-28 PROCEDURE — 83880 ASSAY OF NATRIURETIC PEPTIDE: CPT

## 2021-07-28 PROCEDURE — 80053 COMPREHEN METABOLIC PANEL: CPT

## 2021-07-28 PROCEDURE — 93970 EXTREMITY STUDY: CPT | Mod: 26

## 2021-07-28 PROCEDURE — 85025 COMPLETE CBC W/AUTO DIFF WBC: CPT

## 2021-07-28 PROCEDURE — 93970 EXTREMITY STUDY: CPT

## 2021-07-28 PROCEDURE — 71045 X-RAY EXAM CHEST 1 VIEW: CPT | Mod: 26

## 2021-07-28 NOTE — ED ADULT NURSE REASSESSMENT NOTE - NS ED NURSE REASSESS COMMENT FT1
Pt A+Ox3, VSS, lab results unremarkable. Pt cleared for d/c home with compression stockings and follow up with PCP.

## 2021-07-28 NOTE — ED ADULT NURSE NOTE - NSIMPLEMENTINTERV_GEN_ALL_ED
Implemented All Universal Safety Interventions:  [place] to call system. Call bell, personal items and telephone within reach. Instruct patient to call for assistance. Room bathroom lighting operational. Non-slip footwear when patient is off stretcher. Physically safe environment: no spills, clutter or unnecessary equipment. Stretcher in lowest position, wheels locked, appropriate side rails in place.

## 2021-07-28 NOTE — ED ADULT NURSE NOTE - OBJECTIVE STATEMENT
66y M A&Ox4, ambulates independently, presents to the ED c/o bilateral foot swelling x2 weeks, worsening today. Improved with elevating legs. Pt also notes mild redness and "shiny appearing skin" to both legs. Pt states his PMD is on vacation so he came to ED. Denies SOB. Not on any diuretics.

## 2022-10-27 ENCOUNTER — EMERGENCY (EMERGENCY)
Facility: HOSPITAL | Age: 67
LOS: 1 days | Discharge: HOME | End: 2022-10-27
Attending: EMERGENCY MEDICINE
Payer: COMMERCIAL

## 2022-10-27 VITALS
DIASTOLIC BLOOD PRESSURE: 79 MMHG | HEART RATE: 95 BPM | WEIGHT: 274.92 LBS | RESPIRATION RATE: 18 BRPM | TEMPERATURE: 99 F | HEIGHT: 72 IN | OXYGEN SATURATION: 96 % | SYSTOLIC BLOOD PRESSURE: 143 MMHG

## 2022-10-27 PROCEDURE — 99283 EMERGENCY DEPT VISIT LOW MDM: CPT

## 2022-10-27 NOTE — ED ADULT TRIAGE NOTE - CHIEF COMPLAINT QUOTE
right foot swelling with redness; pt was raking leaves 2 or 3 days ago while wearing flip flops and scraped rake on foot; "getting worse by hours"

## 2022-10-28 VITALS
SYSTOLIC BLOOD PRESSURE: 152 MMHG | HEART RATE: 78 BPM | RESPIRATION RATE: 16 BRPM | OXYGEN SATURATION: 98 % | DIASTOLIC BLOOD PRESSURE: 90 MMHG | TEMPERATURE: 98 F

## 2022-10-28 PROCEDURE — 90715 TDAP VACCINE 7 YRS/> IM: CPT

## 2022-10-28 PROCEDURE — 90471 IMMUNIZATION ADMIN: CPT

## 2022-10-28 PROCEDURE — 99283 EMERGENCY DEPT VISIT LOW MDM: CPT | Mod: 25

## 2022-10-28 PROCEDURE — 73630 X-RAY EXAM OF FOOT: CPT | Mod: 26,RT

## 2022-10-28 PROCEDURE — 73630 X-RAY EXAM OF FOOT: CPT

## 2022-10-28 RX ORDER — ACETAMINOPHEN 500 MG
650 TABLET ORAL ONCE
Refills: 0 | Status: COMPLETED | OUTPATIENT
Start: 2022-10-28 | End: 2022-10-28

## 2022-10-28 RX ORDER — TETANUS TOXOID, REDUCED DIPHTHERIA TOXOID AND ACELLULAR PERTUSSIS VACCINE, ADSORBED 5; 2.5; 8; 8; 2.5 [IU]/.5ML; [IU]/.5ML; UG/.5ML; UG/.5ML; UG/.5ML
0.5 SUSPENSION INTRAMUSCULAR ONCE
Refills: 0 | Status: COMPLETED | OUTPATIENT
Start: 2022-10-28 | End: 2022-10-28

## 2022-10-28 RX ORDER — CEPHALEXIN 500 MG
500 CAPSULE ORAL ONCE
Refills: 0 | Status: COMPLETED | OUTPATIENT
Start: 2022-10-28 | End: 2022-10-28

## 2022-10-28 RX ORDER — CEPHALEXIN 500 MG
1 CAPSULE ORAL
Qty: 40 | Refills: 0
Start: 2022-10-28 | End: 2022-11-06

## 2022-10-28 RX ORDER — IBUPROFEN 200 MG
600 TABLET ORAL ONCE
Refills: 0 | Status: COMPLETED | OUTPATIENT
Start: 2022-10-28 | End: 2022-10-28

## 2022-10-28 RX ADMIN — Medication 650 MILLIGRAM(S): at 02:59

## 2022-10-28 RX ADMIN — Medication 500 MILLIGRAM(S): at 02:59

## 2022-10-28 RX ADMIN — Medication 600 MILLIGRAM(S): at 03:00

## 2022-10-28 RX ADMIN — TETANUS TOXOID, REDUCED DIPHTHERIA TOXOID AND ACELLULAR PERTUSSIS VACCINE, ADSORBED 0.5 MILLILITER(S): 5; 2.5; 8; 8; 2.5 SUSPENSION INTRAMUSCULAR at 03:50

## 2022-10-28 NOTE — ED PROVIDER NOTE - CLINICAL SUMMARY MEDICAL DECISION MAKING FREE TEXT BOX
67-year-old male without any subacute past medical history chief complaint of right foot pain given history and physical concern for cellulitis versus pathological fracture no concern for gout versus septic joint given signs and symptoms will get x-ray and probable treatment for suspected early onset of cellulitis.

## 2022-10-28 NOTE — ED PROVIDER NOTE - NSFOLLOWUPINSTRUCTIONS_ED_ALL_ED_FT
Erysipelas is an infection that affects the skin and tissues near the surface of the skin. It causes the skin to become red, swollen, and painful. The infection is most common on the legs but may also affect other areas, such as the face. With treatment, the infection usually goes away in a few days. If not treated, the infection can spread or lead to other problems, such as collections of pus (abscesses).      What are the causes?    This condition is caused by bacteria. Most often, it is caused by bacteria called streptococci.   Bacteria may get into the skin through a break in the skin, such as:  •A cut or scrape.      •An incision from surgery.  •A burn  •An insect bite.  •An open sore.  •A crack in the skin.  Sometimes, it is not known how the bacteria infected the skin.      Contact a health care provider if:    •Your symptoms do not improve within 1–2 days of starting treatment  •You develop new symptoms.  •You have a fever.  •You feel generally sick (malaise) with muscle aches and pains.  Get help right away if:    •Your symptoms get worse.  •You develop vomiting or diarrhea that does not go away.  •Your red area gets larger or turns dark in color.  •You notice red streaks coming from the infected area Today you were seen in the ED for right foot pain     It was found that you have possible an infection of your foot    Please follow up with A podiatrist information for such be found below    You were given antibiotic called Keflex please take 4 times a day 500 mg for 10 days    What are the causes?    This condition is caused by bacteria. Most often, it is caused by bacteria called streptococci.   Bacteria may get into the skin through a break in the skin, such as:  •A cut or scrape.      •An incision from surgery.  •A burn  •An insect bite.  •An open sore.  •A crack in the skin.  Sometimes, it is not known how the bacteria infected the skin.      Contact a health care provider if:    •Your symptoms do not improve within 1–2 days of starting treatment  •You develop new symptoms.  •You have a fever.  •You feel generally sick (malaise) with muscle aches and pains.  Get help right away if:    •Your symptoms get worse.  •You develop vomiting or diarrhea that does not go away.  •Your red area gets larger or turns dark in color.  •You notice red streaks coming from the infected area

## 2022-10-28 NOTE — ED PROVIDER NOTE - NSICDXPASTMEDICALHX_GEN_ALL_CORE_FT
leukocytosis likely reactive  will monitor   no intervention  afebrile PAST MEDICAL HISTORY:  Hyperlipidemia     No pertinent past medical history

## 2022-10-28 NOTE — ED ADULT NURSE NOTE - NS_SISCREENINGSR_GEN_ALL_ED
[employer] single chamber ICD. Interrogation today demonstrated normal device function. Continue routine device follow up.    Negative

## 2022-10-28 NOTE — ED PROVIDER NOTE - PHYSICAL EXAMINATION
GENERAL: Awake, alert, NAD  LUNGS: CTAB, no wheezes or crackles   CARDIAC: RRR, no m/r/g  ABDOMEN: Soft, , non tender, non distended, no rebound, no guarding  BACK: No midline spinal tenderness, no CVA tenderness  EXT: Tenderness to the first metatarsal is no erythema mild warmth to the right foot small abrasion with scab overlying along the dorsal aspect of the first metatarsal.  NEURO: A&Ox3. Moving all extremities.  SKIN: Warm and dry. No rash.  PSYCH: Normal affect.

## 2022-10-28 NOTE — ED ADULT NURSE NOTE - OBJECTIVE STATEMENT
Pt c/o pain, redness and edema to R foot after he accidently scratched it with a rake. Pt ambulatory, AAO, VSS, resp even and unlabored, GCS 15, NAD noted

## 2022-10-28 NOTE — ED PROVIDER NOTE - ATTENDING CONTRIBUTION TO CARE
Right foot: +TTP [id number] toe, pedal pulse +2    DDx: gout, septic joint, fracture less likely given exam  Mild early cellulitis with contusion PAST SURGICAL HISTORY:  Kidney stone

## 2022-10-28 NOTE — ED PROVIDER NOTE - OBJECTIVE STATEMENT
67-year-old male with 90 significant past medical history chief complaint of right foot pain patient states symptoms started this morning atraumatically.  Patient with 2 days ago he was raking leaves barefoot and might of struck his foot against a regular some other object.  Patient denies any systemic symptoms such as chest pain shortness of breath nausea or vomiting or fever.  Patient presenting today due to increasing pain did not take anything for pain control at home patient notes pain is worse with walking localizes mostly to the right medial aspect of the foot

## 2022-10-28 NOTE — ED ADULT NURSE NOTE - ED CARDIAC CAPILLARY REFILL
PLan:  1.NPO   2.Iv fluids   3.Encourage incentive spirometry   4.ambulate as tolerated  5.DVT and GI prophylaxis   6.SICU care  7.Discuss the starting of home meds. 2 seconds or less

## 2022-10-28 NOTE — ED PROVIDER NOTE - NSFOLLOWUPCLINICS_GEN_ALL_ED_FT
[facility]  Podiatry  [address] [place] [address] - [address] [place] [address]  Phone: [phone]  Fax:   Follow Up Time: 4-6 Days

## 2022-10-28 NOTE — ED PROVIDER NOTE - PATIENT PORTAL LINK FT
You can access the FollowMyHealth Patient Portal offered by [facility] by registering at the following website: [email or web address]. By joining [employer]’s FollowMyHealth portal, you will also be able to view your health information using other applications (apps) compatible with our system.

## 2023-04-24 ENCOUNTER — EMERGENCY (EMERGENCY)
Facility: HOSPITAL | Age: 68
LOS: 1 days | Discharge: HOME | End: 2023-04-24
Attending: EMERGENCY MEDICINE
Payer: COMMERCIAL

## 2023-04-24 VITALS
TEMPERATURE: 98 F | SYSTOLIC BLOOD PRESSURE: 144 MMHG | HEART RATE: 77 BPM | WEIGHT: 285.06 LBS | OXYGEN SATURATION: 98 % | HEIGHT: 71.26 IN | DIASTOLIC BLOOD PRESSURE: 77 MMHG | RESPIRATION RATE: 20 BRPM

## 2023-04-24 VITALS
OXYGEN SATURATION: 97 % | TEMPERATURE: 99 F | HEART RATE: 68 BPM | DIASTOLIC BLOOD PRESSURE: 74 MMHG | RESPIRATION RATE: 19 BRPM | SYSTOLIC BLOOD PRESSURE: 112 MMHG

## 2023-04-24 LAB
ALBUMIN SERPL ELPH-MCNC: 4.3 G/DL — SIGNIFICANT CHANGE UP (ref 3.3–5)
ALP SERPL-CCNC: 99 U/L — SIGNIFICANT CHANGE UP (ref 40–120)
ALT FLD-CCNC: 40 U/L — SIGNIFICANT CHANGE UP (ref 10–45)
ANION GAP SERPL CALC-SCNC: 14 MMOL/L — SIGNIFICANT CHANGE UP (ref 5–17)
AST SERPL-CCNC: 30 U/L — SIGNIFICANT CHANGE UP (ref 10–40)
BASOPHILS # BLD AUTO: 0.03 K/UL — SIGNIFICANT CHANGE UP (ref 0–0.2)
BASOPHILS NFR BLD AUTO: 0.4 % — SIGNIFICANT CHANGE UP (ref 0–2)
BILIRUB SERPL-MCNC: 0.5 MG/DL — SIGNIFICANT CHANGE UP (ref 0.2–1.2)
BUN SERPL-MCNC: 27 MG/DL — HIGH (ref 7–23)
CALCIUM SERPL-MCNC: 9 MG/DL — SIGNIFICANT CHANGE UP (ref 8.4–10.5)
CHLORIDE SERPL-SCNC: 103 MMOL/L — SIGNIFICANT CHANGE UP (ref 96–108)
CO2 SERPL-SCNC: 21 MMOL/L — LOW (ref 22–31)
CREAT SERPL-MCNC: 1.04 MG/DL — SIGNIFICANT CHANGE UP (ref 0.5–1.3)
EGFR: 79 ML/MIN/1.73M2 — SIGNIFICANT CHANGE UP
EOSINOPHIL # BLD AUTO: 0.14 K/UL — SIGNIFICANT CHANGE UP (ref 0–0.5)
EOSINOPHIL NFR BLD AUTO: 2 % — SIGNIFICANT CHANGE UP (ref 0–6)
GLUCOSE SERPL-MCNC: 99 MG/DL — SIGNIFICANT CHANGE UP (ref 70–99)
HCT VFR BLD CALC: 40.6 % — SIGNIFICANT CHANGE UP (ref 39–50)
HGB BLD-MCNC: 13.2 G/DL — SIGNIFICANT CHANGE UP (ref 13–17)
IMM GRANULOCYTES NFR BLD AUTO: 0.3 % — SIGNIFICANT CHANGE UP (ref 0–0.9)
LYMPHOCYTES # BLD AUTO: 1.98 K/UL — SIGNIFICANT CHANGE UP (ref 1–3.3)
LYMPHOCYTES # BLD AUTO: 28.8 % — SIGNIFICANT CHANGE UP (ref 13–44)
MCHC RBC-ENTMCNC: 30.8 PG — SIGNIFICANT CHANGE UP (ref 27–34)
MCHC RBC-ENTMCNC: 32.5 GM/DL — SIGNIFICANT CHANGE UP (ref 32–36)
MCV RBC AUTO: 94.6 FL — SIGNIFICANT CHANGE UP (ref 80–100)
MONOCYTES # BLD AUTO: 0.55 K/UL — SIGNIFICANT CHANGE UP (ref 0–0.9)
MONOCYTES NFR BLD AUTO: 8 % — SIGNIFICANT CHANGE UP (ref 2–14)
NEUTROPHILS # BLD AUTO: 4.16 K/UL — SIGNIFICANT CHANGE UP (ref 1.8–7.4)
NEUTROPHILS NFR BLD AUTO: 60.5 % — SIGNIFICANT CHANGE UP (ref 43–77)
NRBC # BLD: 0 /100 WBCS — SIGNIFICANT CHANGE UP (ref 0–0)
NT-PROBNP SERPL-SCNC: 260 PG/ML — SIGNIFICANT CHANGE UP (ref 0–300)
PLATELET # BLD AUTO: 215 K/UL — SIGNIFICANT CHANGE UP (ref 150–400)
POTASSIUM SERPL-MCNC: 4.9 MMOL/L — SIGNIFICANT CHANGE UP (ref 3.5–5.3)
POTASSIUM SERPL-SCNC: 4.9 MMOL/L — SIGNIFICANT CHANGE UP (ref 3.5–5.3)
PROT SERPL-MCNC: 7.1 G/DL — SIGNIFICANT CHANGE UP (ref 6–8.3)
RBC # BLD: 4.29 M/UL — SIGNIFICANT CHANGE UP (ref 4.2–5.8)
RBC # FLD: 12.9 % — SIGNIFICANT CHANGE UP (ref 10.3–14.5)
SODIUM SERPL-SCNC: 138 MMOL/L — SIGNIFICANT CHANGE UP (ref 135–145)
TROPONIN T, HIGH SENSITIVITY RESULT: 11 NG/L — SIGNIFICANT CHANGE UP (ref 0–51)
TROPONIN T, HIGH SENSITIVITY RESULT: 11 NG/L — SIGNIFICANT CHANGE UP (ref 0–51)
WBC # BLD: 6.88 K/UL — SIGNIFICANT CHANGE UP (ref 3.8–10.5)
WBC # FLD AUTO: 6.88 K/UL — SIGNIFICANT CHANGE UP (ref 3.8–10.5)

## 2023-04-24 PROCEDURE — 83880 ASSAY OF NATRIURETIC PEPTIDE: CPT

## 2023-04-24 PROCEDURE — 93005 ELECTROCARDIOGRAM TRACING: CPT

## 2023-04-24 PROCEDURE — 84484 ASSAY OF TROPONIN QUANT: CPT

## 2023-04-24 PROCEDURE — 71046 X-RAY EXAM CHEST 2 VIEWS: CPT

## 2023-04-24 PROCEDURE — 71046 X-RAY EXAM CHEST 2 VIEWS: CPT | Mod: 26

## 2023-04-24 PROCEDURE — 85025 COMPLETE CBC W/AUTO DIFF WBC: CPT

## 2023-04-24 PROCEDURE — 80053 COMPREHEN METABOLIC PANEL: CPT

## 2023-04-24 PROCEDURE — 99284 EMERGENCY DEPT VISIT MOD MDM: CPT

## 2023-04-24 PROCEDURE — 99285 EMERGENCY DEPT VISIT HI MDM: CPT | Mod: 25

## 2023-04-24 PROCEDURE — 36415 COLL VENOUS BLD VENIPUNCTURE: CPT

## 2023-04-24 PROCEDURE — 93308 TTE F-UP OR LMTD: CPT

## 2023-04-24 NOTE — ED PROVIDER NOTE - PROGRESS NOTE DETAILS
Dayanna: troponin stable. probnp not elevated. a line predominant lungs. no effusions visualized. very limited echo. will d/c home fu with cards and recommend elevation of legs.

## 2023-04-24 NOTE — ED PROVIDER NOTE - CLINICAL SUMMARY MEDICAL DECISION MAKING FREE TEXT BOX
68y M PMHx hip surgery, p/w BLE edema. CHF v venous stasis. Will get labs, trop, ekg, BNP, CXR. if no signs of heart failure, will dc with cardiology/vascular f/u. Patient overall well appearing, not hypoxic, no tachypneic, no respiratory distress. 68y M PMHx hip surgery, p/w BLE edema. CHF v venous stasis. Will get labs, trop, ekg, BNP, CXR. if no signs of heart failure, will dc with cardiology/vascular f/u. Patient overall well appearing, not hypoxic, no tachypneic, no respiratory distress.    aNmita: 68 year old male with b/l le edema worsening in the past few days. history of same a few years ago which resolved on its own. no sob, no n/v. will get labs, cxr, echo, bnp, trop r/o chf versus peripheral venous stasis. reassess.

## 2023-04-24 NOTE — ED ADULT NURSE REASSESSMENT NOTE - NS ED NURSE REASSESS COMMENT FT1
PT sitting up comfortably in the bed. Upon assessment Bilateral lower extremities have +2 pitting edema. PT has full ROM in bilateral lower extremities and full sensation bilaterally. PT denies numbness and tingling in bilateral extremities. Skin is warm, appropriate for race, and very Taut @ B/L lower extremities. Per patient no complaints of Lower Extremity Pain, Chest Pain, or SOB. Safety and comfort measures provided, bed locked and in lowest position,  side rail up for safety. Call bell within reach. Awaiting results and pending Chest X-ray.

## 2023-04-24 NOTE — ED PROVIDER NOTE - NSFOLLOWUPINSTRUCTIONS_ED_ALL_ED_FT
You were seen in the ED with leg swelling.    Your work-up today shows no emergent findings at this time requiring hospital stay.    Please follow-up with your primary care doctor this week.    Please follow-up with a cardiologist. The hospital should call you to help set up an appointment. If you do not hear from someone by tomorrow, please call the number provided to schedule an appointment.    ***Return to the ED if you have any new or worsening symptoms such as difficulty breathing, chest pain, or any other concerning symptoms***

## 2023-04-24 NOTE — ED PROVIDER NOTE - PATIENT PORTAL LINK FT
You can access the FollowMyHealth Patient Portal offered by [facility] by registering at the following website: [email or web address]. By joining [employer]’s FollowMyHealth portal, you will also be able to view your health information using other applications (apps) compatible with our system.

## 2023-04-24 NOTE — ED ADULT NURSE NOTE - OBJECTIVE STATEMENT
Pt is 67 y/o male, presenting to the ED c/o b/l foot swelling. Pt reports that last night he went to a BBQ and had x3 beers and noticed swelling to his feet. Pt reports that he had difficultly putting on his shoes and socks due to swelling, and this is not normal for hip. Pt denies any pertinent PMH or daily medication use. Reports the need for R hip replacement but states "I do not want one". Upon assessment, pt AxOx3, sitting up in stretcher and speaking in full sentences. Breathing spontaneously and unlabored, >95% RA. Abdomen distended, soft and nontender to palpation. Pt placed on continuous cardiac monitor. Pt moves all extremities w/ = strength. Skin is warm, dry, and intact w/ + peripheral pulses, b/l +2 edema noted to LE. Pt denies SOB, chest pain, n/v/d, dizziness, vision changes, chills, and fever. Safety and comfort measures provided- bed in lowest position, locked, and blanket given.

## 2023-04-24 NOTE — ED PROVIDER NOTE - OBJECTIVE STATEMENT
68y M PMHx hip surgery, p/w BLE edema. Patient states he noticed mild swelling for a few weeks, then over the past 1-2 days the swelling in his feet became significantly worse. Patient unable to place socks on this AM so came to ED. States he still swims 1-2 hours regularly without issue. Denies CP, SOB, orthopnea, fevers, recent illness, hx CHF or DVT.

## 2023-04-25 PROCEDURE — 93308 TTE F-UP OR LMTD: CPT | Mod: 26

## 2023-05-12 ENCOUNTER — APPOINTMENT (OUTPATIENT)
Dept: VASCULAR SURGERY | Facility: CLINIC | Age: 68
End: 2023-05-12
Payer: MEDICARE

## 2023-05-12 VITALS
HEIGHT: 71.26 IN | HEART RATE: 78 BPM | BODY MASS INDEX: 42.23 KG/M2 | WEIGHT: 305 LBS | DIASTOLIC BLOOD PRESSURE: 78 MMHG | SYSTOLIC BLOOD PRESSURE: 121 MMHG | TEMPERATURE: 97.8 F

## 2023-05-12 DIAGNOSIS — M79.89 OTHER SPECIFIED SOFT TISSUE DISORDERS: ICD-10-CM

## 2023-05-12 DIAGNOSIS — Z80.9 FAMILY HISTORY OF MALIGNANT NEOPLASM, UNSPECIFIED: ICD-10-CM

## 2023-05-12 DIAGNOSIS — Z87.891 PERSONAL HISTORY OF NICOTINE DEPENDENCE: ICD-10-CM

## 2023-05-12 DIAGNOSIS — I83.893 VARICOSE VEINS OF BILATERAL LOWER EXTREMITIES WITH OTHER COMPLICATIONS: ICD-10-CM

## 2023-05-12 PROCEDURE — 93970 EXTREMITY STUDY: CPT

## 2023-05-12 PROCEDURE — 99204 OFFICE O/P NEW MOD 45 MIN: CPT

## 2023-05-12 RX ORDER — CYANOCOBALAMIN (VITAMIN B-12) 1000 MCG
TABLET, EXTENDED RELEASE ORAL
Refills: 0 | Status: ACTIVE | COMMUNITY

## 2023-05-12 NOTE — END OF VISIT
[] : Fellow [FreeTextEntry3] : CEAP 3/4 disease, pt has some evidence of skin thickening without ulceration.  + GSV reflux, no dvt no deep reflux\par I explained that once there are skin changes, we consider ablation, to prevent development of ulceration.\par Pt has not yet tried compression, he will use daily.\par fu 3 mo [Time Spent: ___ minutes] : I have spent [unfilled] minutes of time on the encounter. H Plasty Text: Given the location of the defect, shape of the defect and the proximity to free margins a H-plasty was deemed most appropriate for repair.  Using a sterile surgical marker, the appropriate advancement arms of the H-plasty were drawn incorporating the defect and placing the expected incisions within the relaxed skin tension lines where possible. The area thus outlined was incised deep to adipose tissue with a #15 scalpel blade. The skin margins were undermined to an appropriate distance in all directions utilizing iris scissors.  The opposing advancement arms were then advanced into place in opposite direction and anchored with interrupted buried subcutaneous sutures.

## 2023-05-12 NOTE — PHYSICAL EXAM
[2+] : left 2+ [Ankle Swelling (On Exam)] : present [Ankle Swelling Bilaterally] : bilaterally  [Ankle Swelling On The Left] : moderate [JVD] : no jugular venous distention  [Varicose Veins Of Lower Extremities] : not present [] : not present [de-identified] : No acute distress. [de-identified] : Normal [de-identified] : Normal

## 2023-05-12 NOTE — HISTORY OF PRESENT ILLNESS
[FreeTextEntry1] : 68M p/w a few month hx of b/l LE swelling. No claudication or rest pain. Was recently seen by cardiologist and nephrologist and balancing his diuretics dose. However, has had worsening swelling with standing. Both legs equal. No wounds. No ulcers. No noticeable varicose veins.

## 2023-08-18 ENCOUNTER — APPOINTMENT (OUTPATIENT)
Dept: VASCULAR SURGERY | Facility: CLINIC | Age: 68
End: 2023-08-18

## 2024-10-30 NOTE — ED PROVIDER NOTE - NS ED ATTENDING STATEMENT MOD
I have personally seen and examined this patient.  I have fully participated in the care of this patient. I have reviewed all pertinent clinical information, including history, physical exam, plan and the Resident’s note and agree except as noted. Stefania